# Patient Record
Sex: FEMALE | Race: WHITE | NOT HISPANIC OR LATINO | Employment: OTHER | URBAN - NONMETROPOLITAN AREA
[De-identification: names, ages, dates, MRNs, and addresses within clinical notes are randomized per-mention and may not be internally consistent; named-entity substitution may affect disease eponyms.]

---

## 2017-03-06 ENCOUNTER — HOSPITAL ENCOUNTER (OUTPATIENT)
Dept: ULTRASOUND IMAGING | Facility: HOSPITAL | Age: 62
Discharge: HOME | End: 2017-03-06
Payer: COMMERCIAL

## 2017-03-06 ENCOUNTER — TRANSCRIBE ORDERS (OUTPATIENT)
Dept: ADMINISTRATIVE | Facility: HOSPITAL | Age: 62
End: 2017-03-06

## 2017-03-06 ENCOUNTER — HOSPITAL ENCOUNTER (OUTPATIENT)
Dept: RADIOLOGY | Facility: HOSPITAL | Age: 62
Discharge: HOME | End: 2017-03-06
Attending: UROLOGY
Payer: COMMERCIAL

## 2017-03-06 DIAGNOSIS — N20.0 URIC ACID NEPHROLITHIASIS: Primary | ICD-10-CM

## 2017-03-06 DIAGNOSIS — N20.0 URIC ACID NEPHROLITHIASIS: ICD-10-CM

## 2017-03-06 DIAGNOSIS — N20.0 CALCULUS OF KIDNEY: ICD-10-CM

## 2017-03-06 PROCEDURE — 76770 US EXAM ABDO BACK WALL COMP: CPT

## 2017-03-06 PROCEDURE — 74000 HB X-RAY EXAM OF ABDOMEN (SINGLE ANTEROPOSTERIOR VIEW): CPT

## 2017-03-20 ENCOUNTER — APPOINTMENT (OUTPATIENT)
Dept: RADIOLOGY | Facility: HOSPITAL | Age: 62
End: 2017-03-20
Attending: UROLOGY
Payer: COMMERCIAL

## 2017-04-03 ENCOUNTER — TRANSCRIBE ORDERS (OUTPATIENT)
Dept: ADMINISTRATIVE | Facility: HOSPITAL | Age: 62
End: 2017-04-03

## 2017-04-03 ENCOUNTER — ALLSCRIPTS OFFICE VISIT (OUTPATIENT)
Dept: OTHER | Facility: OTHER | Age: 62
End: 2017-04-03

## 2017-04-03 DIAGNOSIS — N20.0 URIC ACID NEPHROLITHIASIS: Primary | ICD-10-CM

## 2018-01-13 VITALS
HEART RATE: 80 BPM | HEIGHT: 63 IN | RESPIRATION RATE: 16 BRPM | BODY MASS INDEX: 31.54 KG/M2 | DIASTOLIC BLOOD PRESSURE: 70 MMHG | WEIGHT: 178 LBS | SYSTOLIC BLOOD PRESSURE: 130 MMHG

## 2018-04-03 ENCOUNTER — TRANSCRIBE ORDERS (OUTPATIENT)
Dept: ADMINISTRATIVE | Facility: HOSPITAL | Age: 63
End: 2018-04-03

## 2018-04-03 ENCOUNTER — HOSPITAL ENCOUNTER (OUTPATIENT)
Dept: RADIOLOGY | Facility: HOSPITAL | Age: 63
Discharge: HOME | End: 2018-04-03
Payer: COMMERCIAL

## 2018-04-03 ENCOUNTER — HOSPITAL ENCOUNTER (OUTPATIENT)
Dept: ULTRASOUND IMAGING | Facility: HOSPITAL | Age: 63
Discharge: HOME | End: 2018-04-03
Attending: UROLOGY
Payer: COMMERCIAL

## 2018-04-03 DIAGNOSIS — N20.0 CALCULUS OF KIDNEY: ICD-10-CM

## 2018-04-03 DIAGNOSIS — N20.0 URIC ACID NEPHROLITHIASIS: ICD-10-CM

## 2018-04-03 PROCEDURE — 76770 US EXAM ABDO BACK WALL COMP: CPT

## 2018-04-03 PROCEDURE — 74018 RADEX ABDOMEN 1 VIEW: CPT

## 2018-04-10 ENCOUNTER — PROCEDURE VISIT (OUTPATIENT)
Dept: UROLOGY | Facility: CLINIC | Age: 63
End: 2018-04-10
Payer: COMMERCIAL

## 2018-04-10 VITALS
BODY MASS INDEX: 31.71 KG/M2 | HEIGHT: 63 IN | HEART RATE: 86 BPM | SYSTOLIC BLOOD PRESSURE: 150 MMHG | WEIGHT: 179 LBS | DIASTOLIC BLOOD PRESSURE: 88 MMHG

## 2018-04-10 DIAGNOSIS — R93.89 ABNORMAL ULTRASOUND: Primary | ICD-10-CM

## 2018-04-10 DIAGNOSIS — N20.0 CALCULUS OF KIDNEY: ICD-10-CM

## 2018-04-10 LAB
SL AMB  POCT GLUCOSE, UA: NORMAL
SL AMB LEUKOCYTE ESTERASE,UA: NORMAL
SL AMB POCT BILIRUBIN,UA: NORMAL
SL AMB POCT BLOOD,UA: NORMAL
SL AMB POCT CLARITY,UA: CLEAR
SL AMB POCT COLOR,UA: YELLOW
SL AMB POCT KETONES,UA: NORMAL
SL AMB POCT NITRITE,UA: NORMAL
SL AMB POCT PH,UA: 5
SL AMB POCT SPECIFIC GRAVITY,UA: 1
SL AMB POCT URINE PROTEIN: NORMAL
SL AMB POCT UROBILINOGEN: NORMAL

## 2018-04-10 PROCEDURE — 52000 CYSTOURETHROSCOPY: CPT | Performed by: UROLOGY

## 2018-04-10 PROCEDURE — 99213 OFFICE O/P EST LOW 20 MIN: CPT | Performed by: UROLOGY

## 2018-04-10 PROCEDURE — 81002 URINALYSIS NONAUTO W/O SCOPE: CPT | Performed by: UROLOGY

## 2018-04-10 NOTE — PROGRESS NOTES
UROLOGY PROCEDURE NOTE     History of Present Illness:   Alejandrina Fagan is a 65 y o  female here for follow up evaluation of her nephrolithiasis  She states that since her last appointment she has not passed any urinary calculi and denies lower urinary tract symptoms including gross hematuria  The last time she passed a stone was in September  Prior to this she believes that she has only passed 1 stone 5 years prior  No surgical intervention was ever needed  Has been increasing her hydration as she believes this is the most likely reason she has developed calculi  Ultrasound and KUB obtained prior to visit in  reveals a 4 mm left lower pole calculi  Patient underwent a renal bladder ultrasound and KUB in preparation for her upcoming appointment  Unfortunately, the bladder portion of the ultrasound demonstrated an abnormality at the trigone  Patient's appoint was adjusted to a cystoscopy to evaluate this area with direct visualization  Past Medical History:   History reviewed  No pertinent past medical history  PAST SURGICAL HISTORY:     Past Surgical History:   Procedure Laterality Date     SECTION      DILATION AND CURETTAGE OF UTERUS         CURRENT MEDICATIONS:     Current Outpatient Prescriptions   Medication Sig Dispense Refill    Ascorbic Acid (VITAMIN C) 1000 MG tablet Take 1,000 mg by mouth daily   aspirin (ECOTRIN LOW STRENGTH) 81 mg EC tablet Take 81 mg by mouth daily   Calcium Carb-Cholecalciferol (CALCIUM 600 + D PO) Take 1 tablet by mouth daily   Omega-3 Fatty Acids (FISH OIL) 500 MG CAPS Take 1 capsule by mouth   simvastatin (ZOCOR) 20 mg tablet Take 20 mg by mouth daily at bedtime   ondansetron (ZOFRAN) 4 mg tablet Take 1 tablet (4 mg total) by mouth every 6 (six) hours  12 tablet 0    oxyCODONE-acetaminophen (PERCOCET) 5-325 mg per tablet Take 1 tablet by mouth every 4 (four) hours as needed for moderate pain   Max Daily Amount: 6 tablets 16 tablet 0     No current facility-administered medications for this visit          ALLERGIES:     Allergies   Allergen Reactions    Penicillins        SOCIAL HISTORY:     Social History     Social History    Marital status: /Civil Union     Spouse name: N/A    Number of children: N/A    Years of education: N/A     Occupational History    director  mental health      Social History Main Topics    Smoking status: Never Smoker    Smokeless tobacco: Never Used    Alcohol use Yes    Drug use: No    Sexual activity: Not Asked     Other Topics Concern    None     Social History Narrative    None       SOCIAL HISTORY:     Family History   Problem Relation Age of Onset    Endometrial cancer Mother     Endometrial cancer Sister     Endometrial cancer Cousin        REVIEW OF SYSTEMS:     General: negative for chills, fatigue, fever, significant unplanned weight changed  Psychological: negative for anxiety, depression, concentration or memory difficulties, irritability, mood swings, sleep disturbances  Ophthalmic: negative for blurry vision or double  ENT: negative for hearing difficulties, tinnitus, vertigo  Hematological and Lymphatic: negative for bleeding problems, blood clots, bruising, swollen lymph nodes  Respiratory: negative for shortness of breath, cough, hemoptysis, orthopnea, tachypnea or wheezing  Cardiovascular: negative for chest pain, dyspnea on exertion, edema, irregular or rapid heartbeat, paroxysmal nocturnal dyspnea  Gastrointestinal: negative for abdominal pain, bright red blood in stools, change in stools, constipation, diarrhea, nausea/vomiting, stool incontinence    GENITOURINARY: see HPI    Musculoskeletal: negative for gait disturbance, joint pain, joint stiffness/sweeling/pain, muscular weakness  Dermatological: negative for rash or skin lesion changes  Neurological: negative for confusion, dizziness, headaches, memory loss, numbness/tingling, seizures, speech problems, tremors or weakness    PHYSICAL EXAM:     /88   Pulse 86   Ht 5' 3" (1 6 m)   Wt 81 2 kg (179 lb)   BMI 31 71 kg/m²   General:  Healthy appearing individual in no acute distress  They have a normal affect  There is not appear to be any gross neurologic defects or abnormalities  HEENT:  Normocephalic, atraumatic  Neck is supple without any palpable lymphadenopathy  Cardiovascular:  Patient has normal palpable distal radial pulses  There is no significant peripheral edema  No JVD is noted  Respiratory:  Patient has unlabored respirations  There is no audible wheeze or rhonchi  Abdomen:  Abdomen is without surgical scars  Abdomen is soft and nontender  There is no tympany  Inguinal and umbilical hernia are not appreciated  Genitourinary:  Normal external female genitalia    Musculoskeletal:  Patient does not have significant CVA tenderness with palpation or percussion  They full range of motion in all 4 extremities  Strength in all 4 extremities appears congruent  Patient is able to ambulate without assistance or difficulty  Dermatologic:  Patient has no skin abnormalities or rashes  LABS:     CBC:   Lab Results   Component Value Date    WBC 7 38 2016    HGB 13 4 2016    HCT 40 2 2016    MCV 95 2016     2016       BMP:   Lab Results   Component Value Date    GLUCOSE 119 2016    CALCIUM 9 2 2016     2016    K 4 5 2016    CO2 25 2016     2016    BUN 16 2016    CREATININE 0 80 2016       IMAGIN/3/18  RENAL ULTRASOUND     INDICATION:   N20 0: Calculus of kidney      COMPARISON: None     TECHNIQUE:   Ultrasound of the retroperitoneum was performed with a curvilinear transducer utilizing volumetric sweeps and still imaging techniques       FINDINGS:     KIDNEYS:  Symmetric and normal size  Right kidney:  10 3 x 4 5 cm  Left kidney:  10 3 x 5 2 cm      Right kidney  Normal echogenicity and contour  No suspicious masses detected  No hydronephrosis  No shadowing calculi  No perinephric fluid collections      Left kidney  Normal echogenicity and contour  No suspicious masses detected  No hydronephrosis  No shadowing calculi  No perinephric fluid collections      URETERS:  Nonvisualized      BLADDER:   Normally distended  Focal contour irregularity is noted along the posterior inferior bladder wall which persists on lateral decubitus view  Bilateral ureteral jets detected         IMPRESSION:     Normal upper tracts      Focal irregular contour base of the bladder trigone; intrinsic bladder wall lesion not excludable, best correlated clinically      *The study was marked in EPIC for significant notification  4/3/18  ABDOMEN     INDICATION:   N20 0: Calculus of kidney      COMPARISON:  March 6, 2017     VIEWS:  AP supine  Images: 2     FINDINGS:     4 mm calculus overlying the lower pole the left kidney, unchanged in position  No right renal calculi      No radiopaque ureteral calculi identified      Nonobstructive bowel gas pattern      No acute osseous abnormality is seen      IMPRESSION:     Lower pole left renal calculus (4 mm), unchanged in position  PROCEDURE:       Cystoscopy Procedure note    Risk and benefits of flexible cystoscopy were discussed  Informed consent was obtained  A urine dip is adequate for cystoscopy  The patient was placed into the modified supine position  Her genitalia was prepped with Betadine and draped in a sterile fashion  Viscous 2% lidocaine was instilled into the urethra and allowed dwell time for local anesthesia  Flexible cystoscopy was then performed with a 16F scope  Urethra was inspected on entrace and exit of the scope  The bladder was thoroughly inspected in a 360 degree fashion  Both ureteral orifices were visualized in their orthotopic location with clear efflux of urine  The bladder mucosa was thoroughly inspected   There was no evidence of mucosal abnormalities or lesions  We evaluated the posterior bladder floor and did not see any papillary lesions or erythema  There was a small subcentimeter calculus  Retroflexion for evaluation of the bladder neck was normal       Overall this was a negative cystoscopy with the exception of the small stone  A female office staff member was present throughout the entire procedure  ASSESSMENT:     65 y o  female with stone disease     PLAN:     Reviewed the ultrasound findings with the patient and again allowed her to the watch as we performed her cystourethroscopy  I have no concerns about the posterior bladder floor  I will plan to follow her yearly for her 4 mm left lower pole calculus with KUB and ultrasound

## 2019-04-10 ENCOUNTER — HOSPITAL ENCOUNTER (OUTPATIENT)
Dept: ULTRASOUND IMAGING | Facility: HOSPITAL | Age: 64
Discharge: HOME | End: 2019-04-10
Attending: UROLOGY
Payer: COMMERCIAL

## 2019-04-10 ENCOUNTER — HOSPITAL ENCOUNTER (OUTPATIENT)
Dept: RADIOLOGY | Facility: HOSPITAL | Age: 64
Discharge: HOME | End: 2019-04-10
Attending: UROLOGY
Payer: COMMERCIAL

## 2019-04-10 DIAGNOSIS — N20.0 CALCULUS OF KIDNEY: ICD-10-CM

## 2019-04-10 PROCEDURE — 51798 US URINE CAPACITY MEASURE: CPT

## 2019-04-10 PROCEDURE — 74018 RADEX ABDOMEN 1 VIEW: CPT

## 2019-05-02 ENCOUNTER — OFFICE VISIT (OUTPATIENT)
Dept: UROLOGY | Facility: CLINIC | Age: 64
End: 2019-05-02
Payer: COMMERCIAL

## 2019-05-02 VITALS
SYSTOLIC BLOOD PRESSURE: 122 MMHG | HEART RATE: 74 BPM | DIASTOLIC BLOOD PRESSURE: 70 MMHG | WEIGHT: 158 LBS | HEIGHT: 63 IN | BODY MASS INDEX: 28 KG/M2

## 2019-05-02 DIAGNOSIS — N20.0 LEFT NEPHROLITHIASIS: Primary | ICD-10-CM

## 2019-05-02 DIAGNOSIS — N28.1 RENAL CYST, LEFT: ICD-10-CM

## 2019-05-02 PROCEDURE — 99214 OFFICE O/P EST MOD 30 MIN: CPT | Performed by: PHYSICIAN ASSISTANT

## 2020-05-01 ENCOUNTER — HOSPITAL ENCOUNTER (OUTPATIENT)
Dept: ULTRASOUND IMAGING | Facility: HOSPITAL | Age: 65
Discharge: HOME | End: 2020-05-01
Payer: COMMERCIAL

## 2020-05-01 ENCOUNTER — HOSPITAL ENCOUNTER (OUTPATIENT)
Dept: RADIOLOGY | Facility: HOSPITAL | Age: 65
Discharge: HOME | End: 2020-05-01
Payer: COMMERCIAL

## 2020-05-01 DIAGNOSIS — N28.1 RENAL CYST, LEFT: ICD-10-CM

## 2020-05-01 DIAGNOSIS — N20.0 LEFT NEPHROLITHIASIS: ICD-10-CM

## 2020-05-01 PROCEDURE — 76770 US EXAM ABDO BACK WALL COMP: CPT

## 2020-05-01 PROCEDURE — 74018 RADEX ABDOMEN 1 VIEW: CPT

## 2020-05-07 ENCOUNTER — TELEMEDICINE (OUTPATIENT)
Dept: UROLOGY | Facility: CLINIC | Age: 65
End: 2020-05-07
Payer: COMMERCIAL

## 2020-05-07 DIAGNOSIS — N20.0 CALCULUS OF KIDNEY: Primary | ICD-10-CM

## 2020-05-07 PROCEDURE — 99213 OFFICE O/P EST LOW 20 MIN: CPT | Performed by: PHYSICIAN ASSISTANT

## 2021-01-13 ENCOUNTER — TELEPHONE (OUTPATIENT)
Dept: UROLOGY | Facility: AMBULATORY SURGERY CENTER | Age: 66
End: 2021-01-13

## 2021-01-13 NOTE — TELEPHONE ENCOUNTER
Patient called the office, unsure of what her follow up from [address] in May 2020 would be  I offered to put her on wait list for Caryle Deems, since schedules have not been built for May 2021 yet, but the patient agreed that she has not had issues in many years, and she would prefer to get U/S and KUB done in May, and then call our office for results

## 2021-04-12 ENCOUNTER — OFFICE VISIT (OUTPATIENT)
Dept: OBGYN CLINIC | Facility: CLINIC | Age: 66
End: 2021-04-12
Payer: COMMERCIAL

## 2021-04-12 ENCOUNTER — TELEPHONE (OUTPATIENT)
Dept: ADMINISTRATIVE | Facility: OTHER | Age: 66
End: 2021-04-12

## 2021-04-12 VITALS
WEIGHT: 155 LBS | DIASTOLIC BLOOD PRESSURE: 88 MMHG | HEIGHT: 63 IN | BODY MASS INDEX: 27.46 KG/M2 | SYSTOLIC BLOOD PRESSURE: 140 MMHG

## 2021-04-12 DIAGNOSIS — Z01.419 ENCOUNTER FOR WELL WOMAN EXAM WITH ROUTINE GYNECOLOGICAL EXAM: Primary | ICD-10-CM

## 2021-04-12 DIAGNOSIS — Z12.31 ENCOUNTER FOR SCREENING MAMMOGRAM FOR MALIGNANT NEOPLASM OF BREAST: ICD-10-CM

## 2021-04-12 PROCEDURE — S0610 ANNUAL GYNECOLOGICAL EXAMINA: HCPCS | Performed by: OBSTETRICS & GYNECOLOGY

## 2021-04-12 PROCEDURE — G0145 SCR C/V CYTO,THINLAYER,RESCR: HCPCS | Performed by: OBSTETRICS & GYNECOLOGY

## 2021-04-12 PROCEDURE — 87624 HPV HI-RISK TYP POOLED RSLT: CPT | Performed by: OBSTETRICS & GYNECOLOGY

## 2021-04-12 RX ORDER — FLUTICASONE PROPIONATE 50 MCG
2 SPRAY, SUSPENSION (ML) NASAL
COMMUNITY
Start: 2021-02-24

## 2021-04-12 RX ORDER — LORATADINE 10 MG/1
10 CAPSULE, LIQUID FILLED ORAL
COMMUNITY

## 2021-04-12 NOTE — TELEPHONE ENCOUNTER
----- Message from Beatriz Serna sent at 4/12/2021  8:24 AM EDT -----  Regarding: Missing Results  Patient seen in office today, care everywhere has colonoscopy listed from 2018 but no results are offered  If possible could we update the patients chart to show the results? Thank you!

## 2021-04-12 NOTE — PROGRESS NOTES
ASSESSMENT & PLAN: Odalys Bean is a 60 y o  [id number] with normal gynecologic exam     1   Routine well woman exam done today  2  Pap and HPV:  The patient's last pap and hpv was   It was normal     Pap with cotesting was done today  Current ASCCP Guidelines reviewed  - no record available  3  Mammogram ordered  4  Colorectal cancer screening was not ordered  5  The following were reviewed in today's visit: breast self exam, mammography screening ordered, adequate intake of calcium and vitamin D, exercise and healthy diet      CC:  Annual Gynecologic Examination    HPI: Odalys Bean is a 60 y o  [id number] who presents for annual gynecologic examination  She has the following concerns:  None     Health Maintenance:    She wears her seatbelt routinely  She does perform regular monthly self breast exams  She feels safe at home       Past Medical History:   Diagnosis Date    Abnormal Pap smear of cervix     Cervical polyp 3513,6298    Endometrial polyp 2007    Hypercholesteremia        Past Surgical History:   Procedure Laterality Date     SECTION      COLPOSCOPY      CRYOTHERAPY      DILATION AND CURETTAGE OF UTERUS      DILATION AND CURETTAGE OF UTERUS      ENDOMETRIAL BIOPSY      HYSTEROSCOPY         Past OB/Gyn History:  OB History        2    Para   2    Term   2            AB        Living   2       SAB        TAB        Ectopic        Multiple        Live Births                   Family History   Problem Relation Age of Onset    Endometrial cancer Mother     Hypertension Mother     Endometrial cancer Sister     Endometrial cancer Cousin     Breast cancer Maternal Aunt     Breast cancer Paternal Aunt        Social History:  Social History     Socioeconomic History    Marital status: /Civil Union     Spouse name: Not on file    Number of children: Not on file    Years of education: Not on file    Highest education level: Not on file   Occupational History    Occupation: director  mental health   Social Needs    Financial resource strain: Not on file    Food insecurity     Worry: Not on file     Inability: Not on file   [employer] needs     Medical: Not on file     Non-medical: Not on file   Tobacco Use    Smoking status: Never Smoker    Smokeless tobacco: Never Used   Substance and Sexual Activity    Alcohol use: Yes     Comment: socially    Drug use: No    Sexual activity: Yes     Partners: Male     Birth control/protection: Post-menopausal   Lifestyle    Physical activity     Days per week: Not on file     Minutes per session: Not on file    Stress: Not on file   Relationships    Social connections     Talks on phone: Not on file     Gets together: Not on file     Attends Mu-ism service: Not on file     Active member of club or organization: Not on file     Attends meetings of clubs or organizations: Not on file     Relationship status: Not on file    Intimate partner violence     Fear of current or ex partner: Not on file     Emotionally abused: Not on file     Physically abused: Not on file     Forced sexual activity: Not on file   Other Topics Concern    Not on file   Social History Narrative    Not on file       Allergies   Allergen Reactions    Penicillins        Current Outpatient Medications:     aspirin (ECOTRIN LOW STRENGTH) 81 mg EC tablet, Take 81 mg by mouth daily  , Disp: , Rfl:     Calcium Carb-Cholecalciferol (CALCIUM 600 + D PO), Take 1 tablet by mouth daily  , Disp: , Rfl:     fluticasone (FLONASE) 50 mcg/act nasal spray, 2 sprays into each nostril, Disp: , Rfl:     Loratadine 10 MG CAPS, Take 10 mg by mouth, Disp: , Rfl:     simvastatin (ZOCOR) 20 mg tablet, Take 20 mg by mouth daily at bedtime  , Disp: , Rfl:     Ascorbic Acid (VITAMIN C) 1000 MG tablet, Take 1,000 mg by mouth daily  , Disp: , Rfl:     Omega-3 Fatty Acids (FISH OIL) 500 MG CAPS, Take 1 capsule by mouth , Disp: , Rfl:     ondansetron (ZOFRAN) 4 mg tablet, Take 1 tablet (4 mg total) by mouth every 6 (six) hours  (Patient not taking: Reported on 4/12/2021), Disp: 12 tablet, Rfl: 0    oxyCODONE-acetaminophen (PERCOCET) 5-325 mg per tablet, Take 1 tablet by mouth every 4 (four) hours as needed for moderate pain  Max Daily Amount: 6 tablets (Patient not taking: Reported on 5/2/2019), Disp: 16 tablet, Rfl: 0      Review of Systems  Constitutional :no fever, feels well, no tiredness, no recent weight gain or loss  ENT: no ear ache, no loss of hearing, no nosebleeds or nasal discharge, no sore throat or hoarseness  Cardiovascular: no complaints of slow or fast heart beat, no chest pain, no palpitations, no leg claudication or lower extremity edema  Respiratory: no complaints of shortness of shortness of breath, no CORDERO  Breasts:no complaints of breast pain, breast lump, or nipple discharge  Gastrointestinal: no complaints of abdominal pain, constipation, nausea, vomiting, or diarrhea or bloody stools  Genitourinary : no complaints of dysuria, incontinence, pelvic pain, no dysmenorrhea, vaginal discharge or abnormal vaginal bleeding and as noted in HPI  Musculoskeletal: no complaints of arthralgia, no myalgia, no joint swelling or stiffness, no limb pain or swelling  Integumentary: no complaints of skin rash or lesion, itching or dry skin  Neurological: no complaints of headache, no confusion, no numbness or tingling, no dizziness or fainting    Objective      /88   Ht 5' 3" (1 6 m)   Wt 70 3 kg (155 lb)   BMI 27 46 kg/m²     General:   appears stated age, cooperative, alert normal mood and affect   Lungs: Unlabored breathing    Breasts: Inspection negative, no palpable masses     Abdomen: soft, non-tender, without masses or organomegaly   Vulva: normal   Vagina: normal vagina, no discharge, exudate, lesion, or erythema   Urethra: normal   Cervix: Normal, no discharge  Nontender     Uterus: normal size, contour, position, consistency, mobility, non-tender   Adnexa: no mass, fullness, tenderness   Psychiatric orientation to person, place, and time: normal  mood and affect: normal

## 2021-04-14 LAB
HPV HR 12 DNA CVX QL NAA+PROBE: NEGATIVE
HPV16 DNA CVX QL NAA+PROBE: NEGATIVE
HPV18 DNA CVX QL NAA+PROBE: NEGATIVE

## 2021-04-15 LAB
LAB AP GYN PRIMARY INTERPRETATION: NORMAL
Lab: NORMAL

## 2021-04-15 NOTE — TELEPHONE ENCOUNTER
Upon review of the In Basket request we were able to locate, review, and update the patient chart as requested for CRC: Colonoscopy  Any additional questions or concerns should be emailed to the Practice Liaisons via [email or web address] email, please do not reply via In Basket      Thank you  Yamileth Cruz

## 2021-05-07 ENCOUNTER — HOSPITAL ENCOUNTER (OUTPATIENT)
Dept: RADIOLOGY | Facility: HOSPITAL | Age: 66
Discharge: HOME | End: 2021-05-07
Payer: COMMERCIAL

## 2021-05-07 ENCOUNTER — HOSPITAL ENCOUNTER (OUTPATIENT)
Dept: ULTRASOUND IMAGING | Facility: HOSPITAL | Age: 66
Discharge: HOME | End: 2021-05-07
Payer: COMMERCIAL

## 2021-05-07 DIAGNOSIS — N20.0 CALCULUS OF KIDNEY: ICD-10-CM

## 2021-05-07 PROCEDURE — 76770 US EXAM ABDO BACK WALL COMP: CPT

## 2021-05-07 PROCEDURE — 74018 RADEX ABDOMEN 1 VIEW: CPT

## 2021-05-12 ENCOUNTER — TELEPHONE (OUTPATIENT)
Dept: OTHER | Facility: HOSPITAL | Age: 66
End: 2021-05-12

## 2021-05-12 NOTE — TELEPHONE ENCOUNTER
[facility]EM[facility] patient seen by me last year, I received her US/KUB has a small left lower pole stone which is stable few years  Has not had any symptoms or stone passage in several years  She even had cysto in 2018 for abnormal US which was also normal  Anyway, If she continues to feel well she can followup PRN, if having any concerns can schedule routine AP visit- nothing pending  Parisa Falk

## 2021-05-13 NOTE — TELEPHONE ENCOUNTER
Pt is aware of US/KUB  Pt is not having any issues as of now  Pt will call if she has any problems with the stone

## 2022-05-16 ENCOUNTER — ANNUAL EXAM (OUTPATIENT)
Dept: OBGYN CLINIC | Facility: CLINIC | Age: 67
End: 2022-05-16
Payer: MEDICARE

## 2022-05-16 VITALS
SYSTOLIC BLOOD PRESSURE: 116 MMHG | HEIGHT: 63 IN | DIASTOLIC BLOOD PRESSURE: 64 MMHG | BODY MASS INDEX: 27.71 KG/M2 | WEIGHT: 156.4 LBS

## 2022-05-16 DIAGNOSIS — Z01.419 ENCOUNTER FOR WELL WOMAN EXAM WITH ROUTINE GYNECOLOGICAL EXAM: Primary | ICD-10-CM

## 2022-05-16 DIAGNOSIS — Z12.31 ENCOUNTER FOR SCREENING MAMMOGRAM FOR MALIGNANT NEOPLASM OF BREAST: ICD-10-CM

## 2022-05-16 PROCEDURE — G0101 CA SCREEN;PELVIC/BREAST EXAM: HCPCS | Performed by: OBSTETRICS & GYNECOLOGY

## 2022-05-16 RX ORDER — FLUOCINONIDE TOPICAL SOLUTION USP, 0.05% 0.5 MG/ML
SOLUTION TOPICAL
COMMUNITY
Start: 2022-04-21

## 2022-05-16 RX ORDER — KETOCONAZOLE 20 MG/ML
SHAMPOO TOPICAL
COMMUNITY
Start: 2022-04-14

## 2022-05-16 NOTE — PROGRESS NOTES
ASSESSMENT & PLAN: Malika Yuan is a 73 y o  [id number] with normal gynecologic exam     1   Routine well woman exam done today  2  Pap and HPV:  The patient's last pap and hpv was   It was normal     Pap with cotesting was not done today  Current ASCCP Guidelines reviewed  3   Mammogram ordered  4  Colorectal cancer screening was notordered  5   The following were reviewed in today's visit: breast self exam, mammography screening ordered, menopause, osteoporosis, adequate intake of calcium and vitamin D, exercise and healthy diet  CC:  Annual Gynecologic Examination    HPI: Malika Yuan is a 73 y o  [id number] who presents for annual gynecologic examination  She has the following concerns:  None     Health Maintenance:    She wears her seatbelt routinely  She does perform regular monthly self breast exams  She feels safe at home       Past Medical History:   Diagnosis Date    Abnormal Pap smear of cervix     Alopecia     Cervical polyp [id number]    Endometrial polyp     Hypercholesteremia     Lichen planus follicularis        Past Surgical History:   Procedure Laterality Date     SECTION      COLPOSCOPY      CRYOTHERAPY      DILATION AND CURETTAGE OF UTERUS      DILATION AND CURETTAGE OF UTERUS      ENDOMETRIAL BIOPSY      HYSTEROSCOPY         Past OB/Gyn History:  OB History        2    Para   2    Term   2            AB        Living   2       SAB        IAB        Ectopic        Multiple        Live Births                       Family History   Problem Relation Age of Onset    Endometrial cancer Mother     Hypertension Mother     Endometrial cancer Sister     Endometrial cancer Cousin     Breast cancer Maternal Aunt     Breast cancer Paternal Aunt        Social History:  Social History     Socioeconomic History    Marital status: /Civil Union     Spouse name: Not on file    Number of children: Not on file  Years of education: Not on file    Highest education level: Not on file   Occupational History    Occupation: director  mental health   Tobacco Use    Smoking status: Never Smoker    Smokeless tobacco: Never Used   Vaping Use    Vaping Use: Never used   Substance and Sexual Activity    Alcohol use: Yes     Comment: socially    Drug use: No    Sexual activity: Not Currently     Partners: Male     Birth control/protection: Post-menopausal   Other Topics Concern    Not on file   Social History Narrative    Not on file     Social Determinants of Health     Financial Resource Strain: Not on file   Food Insecurity: Not on file   Transportation Needs: Not on file   Physical Activity: Not on file   Stress: Not on file   Social Connections: Not on file   Intimate Partner Violence: Not on file   Housing Stability: Not on file         Allergies   Allergen Reactions    Penicillins          Current Outpatient Medications:     Calcium Carb-Cholecalciferol (CALCIUM 600 + D PO), Take 1 tablet by mouth daily  , Disp: , Rfl:     fluocinonide (LIDEX) 0 05 % external solution, APPLY SOLUTION TOPICALLY TO AFFECTED AREA TWICE DAILY, Disp: , Rfl:     fluticasone (FLONASE) 50 mcg/act nasal spray, 2 sprays into each nostril, Disp: , Rfl:     ketoconazole (NIZORAL) 2 % shampoo, APPLY TO SCALP, LATHER, LEAVE ON 5 MINUTES AND RINSE EVERY OTHER DAY, Disp: , Rfl:     Loratadine 10 MG CAPS, Take 10 mg by mouth, Disp: , Rfl:     simvastatin (ZOCOR) 20 mg tablet, Take 20 mg by mouth daily at bedtime  , Disp: , Rfl:     Ascorbic Acid (VITAMIN C) 1000 MG tablet, Take 1,000 mg by mouth daily  , Disp: , Rfl:     aspirin (ECOTRIN LOW STRENGTH) 81 mg EC tablet, Take 81 mg by mouth daily  , Disp: , Rfl:     Omega-3 Fatty Acids (FISH OIL) 500 MG CAPS, Take 1 capsule by mouth , Disp: , Rfl:     ondansetron (ZOFRAN) 4 mg tablet, Take 1 tablet (4 mg total) by mouth every 6 (six) hours  , Disp: 12 tablet, Rfl: 0    oxyCODONE-acetaminophen (PERCOCET) 5-325 mg per tablet, Take 1 tablet by mouth every 4 (four) hours as needed for moderate pain  Max Daily Amount: 6 tablets, Disp: 16 tablet, Rfl: 0    Review of Systems  Constitutional :no fever, feels well, no tiredness, no recent weight gain or loss  ENT: no ear ache, no loss of hearing, no nosebleeds or nasal discharge, no sore throat or hoarseness  Cardiovascular: no complaints of slow or fast heart beat, no chest pain, no palpitations, no leg claudication or lower extremity edema  Respiratory: no complaints of shortness of shortness of breath, no CORDERO  Breasts:no complaints of breast pain, breast lump, or nipple discharge  Gastrointestinal: no complaints of abdominal pain, constipation, nausea, vomiting, or diarrhea or bloody stools  Genitourinary : no complaints of dysuria, incontinence, pelvic pain, no dysmenorrhea, vaginal discharge or abnormal vaginal bleeding and as noted in HPI  Musculoskeletal: no complaints of arthralgia, no myalgia, no joint swelling or stiffness, no limb pain or swelling  Integumentary: no complaints of skin rash or lesion, itching or dry skin  Neurological: no complaints of headache, no confusion, no numbness or tingling, no dizziness or fainting    Objective      /64 (BP Location: Right arm, Patient Position: Sitting, Cuff Size: Standard)   Ht 5' 3" (1 6 m)   Wt 70 9 kg (156 lb 6 4 oz)   LMP  (LMP Unknown)   BMI 27 71 kg/m²   General:   appears stated age, cooperative, alert normal mood and affect   Lungs: Unlabored breathing    Breasts: normal appearance, no masses or tenderness   Abdomen: soft, non-tender, without masses or organomegaly   Vulva: normal   Vagina: normal vagina, no discharge, exudate, lesion, or erythema   Urethra: normal   Cervix: Normal, no discharge  Nontender     Uterus: normal size, contour, position, consistency, mobility, non-tender   Adnexa: no mass, fullness, tenderness   Psychiatric orientation to person, place, and time: normal  mood and affect: normal

## 2023-06-19 ENCOUNTER — ANNUAL EXAM (OUTPATIENT)
Dept: OBGYN CLINIC | Facility: CLINIC | Age: 68
End: 2023-06-19
Payer: MEDICARE

## 2023-06-19 VITALS
DIASTOLIC BLOOD PRESSURE: 82 MMHG | BODY MASS INDEX: 27.46 KG/M2 | HEIGHT: 63 IN | WEIGHT: 155 LBS | SYSTOLIC BLOOD PRESSURE: 128 MMHG

## 2023-06-19 DIAGNOSIS — Z12.31 ENCOUNTER FOR SCREENING MAMMOGRAM FOR BREAST CANCER: ICD-10-CM

## 2023-06-19 DIAGNOSIS — Z01.419 ENCOUNTER FOR GYNECOLOGICAL EXAMINATION: Primary | ICD-10-CM

## 2023-06-19 PROCEDURE — G0101 CA SCREEN;PELVIC/BREAST EXAM: HCPCS | Performed by: OBSTETRICS & GYNECOLOGY

## 2023-06-19 RX ORDER — DOXYCYCLINE 100 MG/1
CAPSULE ORAL
COMMUNITY
Start: 2023-02-16

## 2023-06-19 RX ORDER — EZETIMIBE 10 MG/1
10 TABLET ORAL DAILY
COMMUNITY
Start: 2023-04-26

## 2023-06-19 NOTE — PROGRESS NOTES
ASSESSMENT & PLAN: Emigdio Leong is a 78 y o  [id number] with normal gynecologic exam     1   Routine well woman exam done today  2  Pap and HPV:  The patient's last pap and hpv was   It was normal     Pap with cotesting was not done today  Current ASCCP Guidelines reviewed  3   Mammogram ordered  4  Colorectal cancer screening was notordered  5   The following were reviewed in today's visit: breast self exam, mammography screening ordered, menopause, exercise and healthy diet  CC:  Annual Gynecologic Examination    HPI: Emigdio Leong is a 78 y o  [id number] who presents for annual gynecologic examination  She has the following concerns:  None      Health Maintenance:    She wears her seatbelt routinely  She does perform regular monthly self breast exams  She feels safe at home       Past Medical History:   Diagnosis Date   • Abnormal Pap smear of cervix    • Alopecia    • Cervical polyp 167,   • Endometrial polyp    • Hypercholesteremia    • Lichen planus follicularis        Past Surgical History:   Procedure Laterality Date   •  SECTION     • COLPOSCOPY     • CRYOTHERAPY     • DILATION AND CURETTAGE OF UTERUS     • DILATION AND CURETTAGE OF UTERUS     • ENDOMETRIAL BIOPSY     • HYSTEROSCOPY         Past OB/Gyn History:  OB History        2    Para   2    Term   2            AB        Living   2       SAB        IAB        Ectopic        Multiple        Live Births   2                   Family History   Problem Relation Age of Onset   • Endometrial cancer Mother    • Hypertension Mother    • Endometrial cancer Sister    • Endometrial cancer Cousin    • Breast cancer Maternal Aunt    • Breast cancer Paternal Aunt        Social History:  Social History     Socioeconomic History   • Marital status: /Civil Union     Spouse name: Not on file   • Number of children: Not on file   • Years of education: Not on file   • Highest education level: Not on file   Occupational History   • Occupation: director  mental health   Tobacco Use   • Smoking status: Never   • Smokeless tobacco: Never   Vaping Use   • Vaping Use: Never used   Substance and Sexual Activity   • Alcohol use: Yes     Comment: socially   • Drug use: No   • Sexual activity: Not Currently     Partners: Male     Birth control/protection: Post-menopausal   Other Topics Concern   • Not on file   Social History Narrative   • Not on file     Social Determinants of Health     Financial Resource Strain: Not on file   Food Insecurity: Not on file   Transportation Needs: Not on file   Physical Activity: Not on file   Stress: Not on file   Social Connections: Not on file   Intimate Partner Violence: Not on file   Housing Stability: Not on file         Allergies   Allergen Reactions   • Penicillins          Current Outpatient Medications:   •  Calcium Carb-Cholecalciferol (CALCIUM 600 + D PO), Take 1 tablet by mouth daily  , Disp: , Rfl:   •  doxycycline monohydrate (MONODOX) 100 mg capsule, Take 1 pill 2 times daily with food, Disp: , Rfl:   •  ezetimibe (ZETIA) 10 mg tablet, Take 10 mg by mouth daily, Disp: , Rfl:   •  fluocinonide (LIDEX) 0 05 % external solution, APPLY SOLUTION TOPICALLY TO AFFECTED AREA TWICE DAILY, Disp: , Rfl:   •  fluticasone (FLONASE) 50 mcg/act nasal spray, 2 sprays into each nostril, Disp: , Rfl:   •  ketoconazole (NIZORAL) 2 % shampoo, APPLY TO SCALP, LATHER, LEAVE ON 5 MINUTES AND RINSE EVERY OTHER DAY, Disp: , Rfl:   •  Loratadine 10 MG CAPS, Take 10 mg by mouth, Disp: , Rfl:   •  Ascorbic Acid (VITAMIN C) 1000 MG tablet, Take 1,000 mg by mouth daily  (Patient not taking: Reported on 6/19/2023), Disp: , Rfl:   •  aspirin (ECOTRIN LOW STRENGTH) 81 mg EC tablet, Take 81 mg by mouth daily  (Patient not taking: Reported on 6/19/2023), Disp: , Rfl:   •  Omega-3 Fatty Acids (FISH OIL) 500 MG CAPS, Take 1 capsule by mouth   (Patient not taking: Reported on 6/19/2023), "Disp: , Rfl:   •  ondansetron (ZOFRAN) 4 mg tablet, Take 1 tablet (4 mg total) by mouth every 6 (six) hours  (Patient not taking: Reported on 6/19/2023), Disp: 12 tablet, Rfl: 0  •  oxyCODONE-acetaminophen (PERCOCET) 5-325 mg per tablet, Take 1 tablet by mouth every 4 (four) hours as needed for moderate pain  Max Daily Amount: 6 tablets (Patient not taking: Reported on 6/19/2023), Disp: 16 tablet, Rfl: 0  •  simvastatin (ZOCOR) 20 mg tablet, Take 20 mg by mouth daily at bedtime  (Patient not taking: Reported on 6/19/2023), Disp: , Rfl:     Review of Systems  Constitutional :no fever, feels well, no tiredness, no recent weight gain or loss  ENT: no ear ache, no loss of hearing, no nosebleeds or nasal discharge, no sore throat or hoarseness  Cardiovascular: no complaints of slow or fast heart beat, no chest pain, no palpitations, no leg claudication or lower extremity edema  Respiratory: no complaints of shortness of shortness of breath, no CORDERO  Breasts:no complaints of breast pain, breast lump, or nipple discharge  Gastrointestinal: no complaints of abdominal pain, constipation, nausea, vomiting, or diarrhea or bloody stools  Genitourinary : no complaints of dysuria, incontinence, pelvic pain, no dysmenorrhea, vaginal discharge or abnormal vaginal bleeding and as noted in HPI  Musculoskeletal: no complaints of arthralgia, no myalgia, no joint swelling or stiffness, no limb pain or swelling    Integumentary: no complaints of skin rash or lesion, itching or dry skin  Neurological: no complaints of headache, no confusion, no numbness or tingling, no dizziness or fainting    Objective      /82   Ht 5' 3\" (1 6 m)   Wt 70 3 kg (155 lb)   LMP  (LMP Unknown)   BMI 27 46 kg/m²   General:   appears stated age, cooperative, alert normal mood and affect   Lungs: Unlabored breathing    Breasts: normal appearance, no masses or tenderness   Abdomen: soft, non-tender, without masses or organomegaly   Vulva: normal " Vagina: normal vagina, no discharge, exudate, lesion, or erythema   Urethra: normal   Cervix: Normal, no discharge  Nontender     Uterus: normal size, contour, position, consistency, mobility, non-tender   Adnexa: no mass, fullness, tenderness   Psychiatric orientation to person, place, and time: normal  mood and affect: normal

## 2023-09-21 ENCOUNTER — OFFICE VISIT (OUTPATIENT)
Dept: URGENT CARE | Facility: CLINIC | Age: 68
End: 2023-09-21
Payer: MEDICARE

## 2023-09-21 VITALS
HEART RATE: 74 BPM | TEMPERATURE: 98.5 F | SYSTOLIC BLOOD PRESSURE: 130 MMHG | RESPIRATION RATE: 18 BRPM | HEIGHT: 63 IN | WEIGHT: 146 LBS | DIASTOLIC BLOOD PRESSURE: 72 MMHG | OXYGEN SATURATION: 99 % | BODY MASS INDEX: 25.87 KG/M2

## 2023-09-21 DIAGNOSIS — L24.9 IRRITANT CONTACT DERMATITIS, UNSPECIFIED TRIGGER: Primary | ICD-10-CM

## 2023-09-21 PROCEDURE — 99213 OFFICE O/P EST LOW 20 MIN: CPT

## 2023-09-21 PROCEDURE — G0463 HOSPITAL OUTPT CLINIC VISIT: HCPCS

## 2023-09-21 NOTE — PATIENT INSTRUCTIONS
Keep area clean and dry. Apply calamine or caldril lotion. May take claritin or zyrtec as needed for itching. May also apply hydrocortisone on the area. Do not pop the blister. If they pop cover the area.

## 2023-09-21 NOTE — PROGRESS NOTES
[facility]        NAME: Rebekah Vega is a 80 y.o. female  : 1955    MRN: [id number]  DATE: 2023  TIME: 10:46 AM    Assessment and Plan   Irritant contact dermatitis, unspecified trigger [L24.9]  1. Irritant contact dermatitis, unspecified trigger              Patient Instructions       Follow up with PCP in 3-5 days. Proceed to  ER if symptoms worsen. Chief Complaint     Chief Complaint   Patient presents with   • Rash         History of Present Illness       HPI    Review of Systems   Review of Systems      Current Medications       Current Outpatient Medications:   •  Calcium Carb-Cholecalciferol (CALCIUM 600 + D PO), Take 1 tablet by mouth daily. , Disp: , Rfl:   •  ezetimibe (ZETIA) 10 mg tablet, Take 10 mg by mouth daily, Disp: , Rfl:   •  Ascorbic Acid (VITAMIN C) 1000 MG tablet, Take 1,000 mg by mouth daily. (Patient not taking: Reported on 2023), Disp: , Rfl:   •  aspirin (ECOTRIN LOW STRENGTH) 81 mg EC tablet, Take 81 mg by mouth daily. (Patient not taking: Reported on 2023), Disp: , Rfl:   •  doxycycline monohydrate (MONODOX) 100 mg capsule, Take 1 pill 2 times daily with food, Disp: , Rfl:   •  fluocinonide (LIDEX) 0.05 % external solution, APPLY SOLUTION TOPICALLY TO AFFECTED AREA TWICE DAILY, Disp: , Rfl:   •  fluticasone (FLONASE) 50 mcg/act nasal spray, 2 sprays into each nostril, Disp: , Rfl:   •  ketoconazole (NIZORAL) 2 % shampoo, APPLY TO SCALP, LATHER, LEAVE ON 5 MINUTES AND RINSE EVERY OTHER DAY, Disp: , Rfl:   •  Loratadine 10 MG CAPS, Take 10 mg by mouth, Disp: , Rfl:   •  Omega-3 Fatty Acids (FISH OIL) 500 MG CAPS, Take 1 capsule by mouth. (Patient not taking: Reported on 2023), Disp: , Rfl:   •  ondansetron (ZOFRAN) 4 mg tablet, Take 1 tablet (4 mg total) by mouth every 6 (six) hours.  (Patient not taking: Reported on 2023), Disp: 12 tablet, Rfl: 0  •  oxyCODONE-acetaminophen (PERCOCET) 5-325 mg per tablet, Take 1 tablet by mouth every 4 (four) hours as needed for moderate pain. Max Daily Amount: 6 tablets (Patient not taking: Reported on 2023), Disp: 16 tablet, Rfl: 0  •  simvastatin (ZOCOR) 20 mg tablet, Take 20 mg by mouth daily at bedtime. (Patient not taking: Reported on 2023), Disp: , Rfl:     Current Allergies     Allergies as of 2023 - Reviewed 2023   Allergen Reaction Noted   • Penicillins  2016            The following portions of the patient's history were reviewed and updated as appropriate: allergies, current medications, past family history, past medical history, past social history, past surgical history and problem list.     Past Medical History:   Diagnosis Date   • Abnormal Pap smear of cervix    • Alopecia    • Cervical polyp [id number]   • Endometrial polyp    • Hypercholesteremia    • Lichen planus follicularis        Past Surgical History:   Procedure Laterality Date   •  SECTION     • COLPOSCOPY     • CRYOTHERAPY     • DILATION AND CURETTAGE OF UTERUS     • [address] OF UTERUS     • ENDOMETRIAL BIOPSY     • HYSTEROSCOPY         Family History   Problem Relation Age of Onset   • Endometrial cancer Mother    • Hypertension Mother    • Endometrial cancer Sister    • Endometrial cancer Cousin    • Breast cancer Maternal Aunt    • Breast cancer Paternal Aunt          Medications have been verified.         Objective   /72   Pulse 74   Temp 98.5 °F (36.9 °C)   Resp 18   Ht 5' 3" (1.6 m)   Wt 66.2 kg (146 lb)   LMP  (LMP Unknown)   SpO2 99%   BMI 25.86 kg/m²        Physical Exam     Physical Exam

## 2023-09-21 NOTE — PROGRESS NOTES
[facility]        NAME: Maged Hutchins is a 80 y.o. female  : 1955    MRN: [id number]  DATE: 2023  TIME: 10:39 AM    Assessment and Plan   Irritant contact dermatitis, unspecified trigger [L24.9]  1. Irritant contact dermatitis, unspecified trigger          Consistent with poison ivy recommend supportive care. Patient Instructions   Keep area clean and dry. Apply calamine or caldril lotion. May take claritin or zyrtec as needed for itching. May also apply hydrocortisone on the area. Do not pop the blister. If they pop cover the area. Follow up with PCP in 3-5 days. Proceed to  ER if symptoms worsen. Chief Complaint     Chief Complaint   Patient presents with   • Rash         History of Present Illness       Patient is a 26-year-old female who presents to the office today for a blistering rash to the left upper arm. She states that she noticed it Monday or Tuesday. She states that she returned from the beach on Saturday but did not have any sunburn. She did cut the grass but states that she was wearing long sleeves. The rash is itchy and none painful in nature. She is concerned for shingles. Review of Systems   Review of Systems   Skin: Positive for rash. All other systems reviewed and are negative. Current Medications       Current Outpatient Medications:   •  Calcium Carb-Cholecalciferol (CALCIUM 600 + D PO), Take 1 tablet by mouth daily. , Disp: , Rfl:   •  ezetimibe (ZETIA) 10 mg tablet, Take 10 mg by mouth daily, Disp: , Rfl:   •  Ascorbic Acid (VITAMIN C) 1000 MG tablet, Take 1,000 mg by mouth daily. (Patient not taking: Reported on 2023), Disp: , Rfl:   •  aspirin (ECOTRIN LOW STRENGTH) 81 mg EC tablet, Take 81 mg by mouth daily.  (Patient not taking: Reported on 2023), Disp: , Rfl:   •  doxycycline monohydrate (MONODOX) 100 mg capsule, Take 1 pill 2 times daily with food, Disp: , Rfl:   •  fluocinonide (LIDEX) 0.05 % external solution, APPLY SOLUTION TOPICALLY TO AFFECTED AREA TWICE DAILY, Disp: , Rfl:   •  fluticasone (FLONASE) 50 mcg/act nasal spray, 2 sprays into each nostril, Disp: , Rfl:   •  ketoconazole (NIZORAL) 2 % shampoo, APPLY TO SCALP, LATHER, LEAVE ON 5 MINUTES AND RINSE EVERY OTHER DAY, Disp: , Rfl:   •  Loratadine 10 MG CAPS, Take 10 mg by mouth, Disp: , Rfl:   •  Omega-3 Fatty Acids (FISH OIL) 500 MG CAPS, Take 1 capsule by mouth. (Patient not taking: Reported on 2023), Disp: , Rfl:   •  ondansetron (ZOFRAN) 4 mg tablet, Take 1 tablet (4 mg total) by mouth every 6 (six) hours. (Patient not taking: Reported on 2023), Disp: 12 tablet, Rfl: 0  •  oxyCODONE-acetaminophen (PERCOCET) 5-325 mg per tablet, Take 1 tablet by mouth every 4 (four) hours as needed for moderate pain. Max Daily Amount: 6 tablets (Patient not taking: Reported on 2023), Disp: 16 tablet, Rfl: 0  •  simvastatin (ZOCOR) 20 mg tablet, Take 20 mg by mouth daily at bedtime.  (Patient not taking: Reported on 2023), Disp: , Rfl:     Current Allergies     Allergies as of 2023 - Reviewed 2023   Allergen Reaction Noted   • Penicillins  2016            The following portions of the patient's history were reviewed and updated as appropriate: allergies, current medications, past family history, past medical history, past social history, past surgical history and problem list.     Past Medical History:   Diagnosis Date   • Abnormal Pap smear of cervix    • Alopecia    • Cervical polyp [id number]   • Endometrial polyp    • Hypercholesteremia    • Lichen planus follicularis        Past Surgical History:   Procedure Laterality Date   •  SECTION     • COLPOSCOPY     • CRYOTHERAPY     • DILATION AND CURETTAGE OF UTERUS     • [address] OF UTERUS     • ENDOMETRIAL BIOPSY     • HYSTEROSCOPY         Family History   Problem Relation Age of Onset   • Endometrial cancer Mother    • Hypertension Mother    • Endometrial cancer Sister    • Endometrial cancer Cousin    • Breast cancer Maternal Aunt    • Breast cancer Paternal Aunt          Medications have been verified. Objective   BP (!) 172/83   Pulse 74   Temp 98.5 °F (36.9 °C)   Resp 18   Ht 5' 3" (1.6 m)   Wt 66.2 kg (146 lb)   LMP  (LMP Unknown)   SpO2 99%   BMI 25.86 kg/m²        Physical Exam     Physical Exam  Vitals and nursing note reviewed. Constitutional:       Appearance: Normal appearance. She is normal weight. Cardiovascular:      Rate and Rhythm: Normal rate and regular rhythm. Pulses: Normal pulses. Heart sounds: Normal heart sounds. Pulmonary:      Effort: Pulmonary effort is normal.      Breath sounds: Normal breath sounds. Skin:     Capillary Refill: Capillary refill takes less than 2 seconds. Findings: Rash present. Comments: There are satellite rash lesions noted on the upper arm as well as the blistery rash on the posterior aspect of the tricep area. Rash does not follow dermatome and is pruritic in nature. Neurological:      General: No focal deficit present. Mental Status: She is alert and oriented to person, place, and time.

## 2024-04-03 ENCOUNTER — APPOINTMENT (EMERGENCY)
Dept: RADIOLOGY | Facility: HOSPITAL | Age: 69
End: 2024-04-03
Payer: MEDICARE

## 2024-04-03 ENCOUNTER — HOSPITAL ENCOUNTER (EMERGENCY)
Facility: HOSPITAL | Age: 69
Discharge: HOME | End: 2024-04-03
Attending: EMERGENCY MEDICINE | Admitting: EMERGENCY MEDICINE
Payer: MEDICARE

## 2024-04-03 VITALS
SYSTOLIC BLOOD PRESSURE: 156 MMHG | TEMPERATURE: 97.8 F | HEART RATE: 88 BPM | RESPIRATION RATE: 13 BRPM | OXYGEN SATURATION: 100 % | DIASTOLIC BLOOD PRESSURE: 80 MMHG | BODY MASS INDEX: 28.12 KG/M2 | WEIGHT: 158.73 LBS

## 2024-04-03 DIAGNOSIS — R07.9 CHEST PAIN: ICD-10-CM

## 2024-04-03 DIAGNOSIS — F41.9 ANXIETY: Primary | ICD-10-CM

## 2024-04-03 DIAGNOSIS — R07.9 CHEST PAIN, UNSPECIFIED: ICD-10-CM

## 2024-04-03 LAB
2HR DELTA HS TROPONIN: >0 NG/L
ALBUMIN SERPL BCP-MCNC: 4.4 G/DL (ref 3.5–5)
ALP SERPL-CCNC: 61 U/L (ref 34–104)
ALT SERPL W P-5'-P-CCNC: 33 U/L (ref 7–52)
ANION GAP SERPL CALCULATED.3IONS-SCNC: 8 MMOL/L (ref 4–13)
AST SERPL W P-5'-P-CCNC: 22 U/L (ref 13–39)
BASOPHILS # BLD AUTO: 0.04 THOUSANDS/ÂΜL (ref 0–0.1)
BASOPHILS NFR BLD AUTO: 1 % (ref 0–1)
BILIRUB SERPL-MCNC: 0.35 MG/DL (ref 0.2–1)
BUN SERPL-MCNC: 18 MG/DL (ref 5–25)
CALCIUM SERPL-MCNC: 9.6 MG/DL (ref 8.4–10.2)
CARDIAC TROPONIN I PNL SERPL HS: 2 NG/L
CARDIAC TROPONIN I PNL SERPL HS: <2 NG/L
CHLORIDE SERPL-SCNC: 105 MMOL/L (ref 96–108)
CO2 SERPL-SCNC: 28 MMOL/L (ref 21–32)
CREAT SERPL-MCNC: 0.85 MG/DL (ref 0.6–1.3)
D DIMER PPP FEU-MCNC: 0.31 UG/ML FEU
EOSINOPHIL # BLD AUTO: 0.08 THOUSAND/ÂΜL (ref 0–0.61)
EOSINOPHIL NFR BLD AUTO: 1 % (ref 0–6)
ERYTHROCYTE [DISTWIDTH] IN BLOOD BY AUTOMATED COUNT: 12.2 % (ref 11.6–15.1)
GFR SERPL CREATININE-BSD FRML MDRD: 71 ML/MIN/1.73SQ M
GLUCOSE SERPL-MCNC: 103 MG/DL (ref 65–140)
HCT VFR BLD AUTO: 41.3 % (ref 34.8–46.1)
HGB BLD-MCNC: 13.7 G/DL (ref 11.5–15.4)
IMM GRANULOCYTES # BLD AUTO: 0.02 THOUSAND/UL (ref 0–0.2)
IMM GRANULOCYTES NFR BLD AUTO: 0 % (ref 0–2)
LYMPHOCYTES # BLD AUTO: 2.04 THOUSANDS/ÂΜL (ref 0.6–4.47)
LYMPHOCYTES NFR BLD AUTO: 36 % (ref 14–44)
MCH RBC QN AUTO: 32.4 PG (ref 26.8–34.3)
MCHC RBC AUTO-ENTMCNC: 33.2 G/DL (ref 31.4–37.4)
MCV RBC AUTO: 98 FL (ref 82–98)
MONOCYTES # BLD AUTO: 0.47 THOUSAND/ÂΜL (ref 0.17–1.22)
MONOCYTES NFR BLD AUTO: 8 % (ref 4–12)
NEUTROPHILS # BLD AUTO: 2.98 THOUSANDS/ÂΜL (ref 1.85–7.62)
NEUTS SEG NFR BLD AUTO: 54 % (ref 43–75)
NRBC BLD AUTO-RTO: 0 /100 WBCS
PLATELET # BLD AUTO: 330 THOUSANDS/UL (ref 149–390)
PMV BLD AUTO: 9.6 FL (ref 8.9–12.7)
POTASSIUM SERPL-SCNC: 4.1 MMOL/L (ref 3.5–5.3)
PROT SERPL-MCNC: 7.1 G/DL (ref 6.4–8.4)
RBC # BLD AUTO: 4.23 MILLION/UL (ref 3.81–5.12)
SODIUM SERPL-SCNC: 141 MMOL/L (ref 135–147)
WBC # BLD AUTO: 5.63 THOUSAND/UL (ref 4.31–10.16)

## 2024-04-03 PROCEDURE — 71045 X-RAY EXAM CHEST 1 VIEW: CPT

## 2024-04-03 PROCEDURE — 84484 ASSAY OF TROPONIN QUANT: CPT

## 2024-04-03 PROCEDURE — 80053 COMPREHEN METABOLIC PANEL: CPT

## 2024-04-03 PROCEDURE — 85025 COMPLETE CBC W/AUTO DIFF WBC: CPT

## 2024-04-03 PROCEDURE — 85379 FIBRIN DEGRADATION QUANT: CPT | Performed by: EMERGENCY MEDICINE

## 2024-04-03 PROCEDURE — 93005 ELECTROCARDIOGRAM TRACING: CPT

## 2024-04-03 PROCEDURE — 99285 EMERGENCY DEPT VISIT HI MDM: CPT

## 2024-04-03 PROCEDURE — 36415 COLL VENOUS BLD VENIPUNCTURE: CPT | Performed by: EMERGENCY MEDICINE

## 2024-04-03 PROCEDURE — 99285 EMERGENCY DEPT VISIT HI MDM: CPT | Performed by: EMERGENCY MEDICINE

## 2024-04-03 RX ORDER — HYDROXYZINE HYDROCHLORIDE 25 MG/1
25 TABLET, FILM COATED ORAL
Qty: 12 TABLET | Refills: 0 | Status: SHIPPED | OUTPATIENT
Start: 2024-04-03

## 2024-04-03 NOTE — DISCHARGE INSTRUCTIONS
Please follow up with your primary care doctor, you may need further testing such as a stress test.     Please take atarax at night as needed for anxiety.

## 2024-04-03 NOTE — ED PROVIDER NOTES
"History  Chief Complaint   Patient presents with    Chest Pain     Reports \"slight\" chest pain. Reports it started last week, subsided and has since returned. Family h/o cardiac issues, reports CP is giving her anxiety due to the history.      HPI    68-year-old female with past medical history of hyperlipidemia who presents for evaluation of chest pain.  Patient states she had an episode of chest pain last week which started while she was doing household chores.  She states it resolved.  She again developed chest pain earlier this morning.  She states she was doing a light workout and noticed the pain after.  She states pain is in the center of her chest.  Denies any radiation of the pain.  Denies any associated lightheadedness, dizziness, shortness of breath, or nausea/vomiting.  Denies recent fevers, chills, or cough.  Denies abdominal pain.  Denies leg pain or leg swelling.  Patient states she otherwise has never had pain like this in the past.  She states she does have a family history of heart disease but denies any personal history of cardiac disease.  Denies history of DVT or PE.    Prior to Admission Medications   Prescriptions Last Dose Informant Patient Reported? Taking?   Ascorbic Acid (VITAMIN C) 1000 MG tablet  Self Yes No   Sig: Take 1,000 mg by mouth daily.   Patient not taking: Reported on 6/19/2023   Calcium Carb-Cholecalciferol (CALCIUM 600 + D PO)  Self Yes No   Sig: Take 1 tablet by mouth daily.   Loratadine 10 MG CAPS   Yes No   Sig: Take 10 mg by mouth   Omega-3 Fatty Acids (FISH OIL) 500 MG CAPS  Self Yes No   Sig: Take 1 capsule by mouth.   Patient not taking: Reported on 6/19/2023   aspirin (ECOTRIN LOW STRENGTH) 81 mg EC tablet  Self Yes No   Sig: Take 81 mg by mouth daily.   Patient not taking: Reported on 6/19/2023   doxycycline monohydrate (MONODOX) 100 mg capsule   Yes No   Sig: Take 1 pill 2 times daily with food   ezetimibe (ZETIA) 10 mg tablet   Yes No   Sig: Take 10 mg by mouth " daily   fluocinonide (LIDEX) 0.05 % external solution   Yes No   Sig: APPLY SOLUTION TOPICALLY TO AFFECTED AREA TWICE DAILY   fluticasone (FLONASE) 50 mcg/act nasal spray   Yes No   Si sprays into each nostril   ketoconazole (NIZORAL) 2 % shampoo   Yes No   Sig: APPLY TO SCALP, LATHER, LEAVE ON 5 MINUTES AND RINSE EVERY OTHER DAY   ondansetron (ZOFRAN) 4 mg tablet  Self No No   Sig: Take 1 tablet (4 mg total) by mouth every 6 (six) hours.   Patient not taking: Reported on 2023   oxyCODONE-acetaminophen (PERCOCET) 5-325 mg per tablet  Self No No   Sig: Take 1 tablet by mouth every 4 (four) hours as needed for moderate pain. Max Daily Amount: 6 tablets   Patient not taking: Reported on 2023   simvastatin (ZOCOR) 20 mg tablet  Self Yes No   Sig: Take 20 mg by mouth daily at bedtime.   Patient not taking: Reported on 2023      Facility-Administered Medications: None       Past Medical History:   Diagnosis Date    Abnormal Pap smear of cervix     Alopecia     Cervical polyp ,    Endometrial polyp     Hypercholesteremia     Lichen planus follicularis        Past Surgical History:   Procedure Laterality Date     SECTION      COLPOSCOPY      CRYOTHERAPY      DILATION AND CURETTAGE OF UTERUS      DILATION AND CURETTAGE OF UTERUS      ENDOMETRIAL BIOPSY  2009    HYSTEROSCOPY  2007       Family History   Problem Relation Age of Onset    Endometrial cancer Mother     Hypertension Mother     Endometrial cancer Sister     Endometrial cancer Cousin     Breast cancer Maternal Aunt     Breast cancer Paternal Aunt      I have reviewed and agree with the history as documented.    E-Cigarette/Vaping    E-Cigarette Use Never User      E-Cigarette/Vaping Substances    Nicotine No     THC No     CBD No     Flavoring No     Other No     Unknown No      Social History     Tobacco Use    Smoking status: Never    Smokeless tobacco: Never   Vaping Use    Vaping status: Never Used    Substance Use Topics    Alcohol use: Yes     Comment: socially    Drug use: No       Review of Systems   Constitutional:  Negative for appetite change, chills and fever.   HENT:  Negative for congestion, rhinorrhea and sore throat.    Respiratory:  Negative for cough and shortness of breath.    Cardiovascular:  Positive for chest pain. Negative for leg swelling.   Gastrointestinal:  Negative for abdominal pain, diarrhea, nausea and vomiting.   Genitourinary:  Negative for dysuria, frequency, hematuria and urgency.   Musculoskeletal:  Negative for arthralgias and myalgias.   Skin:  Negative for rash.   Neurological:  Negative for dizziness, weakness, light-headedness, numbness and headaches.   All other systems reviewed and are negative.      Physical Exam  Physical Exam  Vitals and nursing note reviewed.   Constitutional:       General: She is not in acute distress.     Appearance: Normal appearance. She is well-developed and normal weight. She is not ill-appearing, toxic-appearing or diaphoretic.   HENT:      Head: Normocephalic and atraumatic.      Right Ear: External ear normal.      Left Ear: External ear normal.      Nose: Nose normal.      Mouth/Throat:      Mouth: Mucous membranes are moist.      Pharynx: Oropharynx is clear.   Eyes:      Extraocular Movements: Extraocular movements intact.      Conjunctiva/sclera: Conjunctivae normal.   Cardiovascular:      Rate and Rhythm: Regular rhythm. Tachycardia present.      Pulses: Normal pulses.      Heart sounds: Normal heart sounds. No murmur heard.     No friction rub. No gallop.   Pulmonary:      Effort: Pulmonary effort is normal. No respiratory distress.      Breath sounds: Normal breath sounds. No decreased breath sounds, wheezing, rhonchi or rales.   Abdominal:      General: There is no distension.      Palpations: Abdomen is soft.      Tenderness: There is no abdominal tenderness. There is no guarding or rebound.   Musculoskeletal:         General: No  tenderness.      Cervical back: Neck supple.      Right lower leg: No tenderness. No edema.      Left lower leg: No tenderness. No edema.   Skin:     General: Skin is warm and dry.      Coloration: Skin is not pale.      Findings: No erythema or rash.   Neurological:      General: No focal deficit present.      Mental Status: She is alert and oriented to person, place, and time.      Cranial Nerves: No cranial nerve deficit.      Sensory: No sensory deficit.      Motor: No weakness.   Psychiatric:         Mood and Affect: Mood normal.         Behavior: Behavior normal.         Vital Signs  ED Triage Vitals [04/03/24 1313]   Temperature Pulse Respirations Blood Pressure SpO2   97.8 °F (36.6 °C) 103 18 (!) 174/88 99 %      Temp Source Heart Rate Source Patient Position - Orthostatic VS BP Location FiO2 (%)   Temporal Monitor Sitting Left arm --      Pain Score       2           Vitals:    04/03/24 1430 04/03/24 1500 04/03/24 1530 04/03/24 1600   BP: 160/83 138/74 148/74 156/80   Pulse: 90 86 92 88   Patient Position - Orthostatic VS: Sitting            Visual Acuity      ED Medications  Medications - No data to display    Diagnostic Studies  Results Reviewed       Procedure Component Value Units Date/Time    HS Troponin I 2hr [[id number]]  (Normal) Collected: 04/03/24 1519    Lab Status: Final result Specimen: Blood from Arm, Right Updated: 04/03/24 1610     hs TnI 2hr 2 ng/L      Delta 2hr hsTnI >0 ng/L     HS Troponin 0hr (reflex protocol) [[id number]]  (Normal) Collected: 04/03/24 1320    Lab Status: Final result Specimen: Blood from Arm, Right Updated: 04/03/24 1348     hs TnI 0hr <2 ng/L     D-dimer, quantitative [[id number]]  (Normal) Collected: 04/03/24 1320    Lab Status: Final result Specimen: Blood from Arm, Right Updated: 04/03/24 1344     D-Dimer, Quant 0.31 ug/ml FEU     Narrative:      In the evaluation for possible pulmonary embolism, in the appropriate (Well's Score of 4 or less) patient, the age adjusted  d-dimer cutoff for this patient can be calculated as:    Age x 0.01 (in ug/mL) for Age-adjusted D-dimer exclusion threshold for a patient over 50 years.    Comprehensive metabolic panel [[id number]] Collected: 04/03/24 1320    Lab Status: Final result Specimen: Blood from Arm, Right Updated: 04/03/24 1342     Sodium 141 mmol/L      Potassium 4.1 mmol/L      Chloride 105 mmol/L      CO2 28 mmol/L      ANION GAP 8 mmol/L      BUN 18 mg/dL      Creatinine 0.85 mg/dL      Glucose 103 mg/dL      Calcium 9.6 mg/dL      AST 22 U/L      ALT 33 U/L      Alkaline Phosphatase 61 U/L      Total Protein 7.1 g/dL      Albumin 4.4 g/dL      Total Bilirubin 0.35 mg/dL      eGFR 71 ml/min/1.73sq m     Narrative:      National Kid[employer] Foundation guidelines for Chronic Kidney Disease (CKD):     Stage 1 with normal or high GFR (GFR > 90 mL/min/1.73 square meters)    Stage 2 Mild CKD (GFR = 60-89 mL/min/1.73 square meters)    Stage 3A Moderate CKD (GFR = 45-59 mL/min/1.73 square meters)    Stage 3B Moderate CKD (GFR = 30-44 mL/min/1.73 square meters)    Stage 4 Severe CKD (GFR = 15-29 mL/min/1.73 square meters)    Stage 5 End Stage CKD (GFR <15 mL/min/1.73 square meters)  Note: GFR calculation is accurate only with a steady state creatinine    CBC and differential [[id number]] Collected: 04/03/24 1320    Lab Status: Final result Specimen: Blood from Arm, Right Updated: 04/03/24 1326     WBC 5.63 Thousand/uL      RBC 4.23 Million/uL      Hemoglobin 13.7 g/dL      Hematocrit 41.3 %      MCV 98 fL      MCH 32.4 pg      MCHC 33.2 g/dL      RDW 12.2 %      MPV 9.6 fL      Platelets 330 Thousands/uL      nRBC 0 /100 WBCs      Neutrophils Relative 54 %      Immature Grans % 0 %      Lymphocytes Relative 36 %      Monocytes Relative 8 %      Eosinophils Relative 1 %      Basophils Relative 1 %      Neutrophils Absolute 2.98 Thousands/µL      Absolute Immature Grans 0.02 Thousand/uL      Absolute Lymphocytes 2.04 Thousands/µL      Absolute  Monocytes 0.47 Thousand/µL      Eosinophils Absolute 0.08 Thousand/µL      Basophils Absolute 0.04 Thousands/µL                    XR chest 1 view portable   Final Result by Urvashi Peace MD (04/03 1632)      No acute cardiopulmonary disease.            Workstation performed: [id number]                    Procedures  ECG 12 Lead Documentation Only    Date/Time: 4/3/2024 2:11 PM    Performed by: Yasmine Sparrow MD  Authorized by: Yasmine Sparrow MD    Indications / Diagnosis:  Chest pain  ECG reviewed by me, the ED Provider: yes    Patient location:  ED  Previous ECG:     Previous ECG:  Unavailable    Comparison to cardiac monitor: Yes    Interpretation:     Interpretation: non-specific    Rate:     ECG rate:  105    ECG rate assessment: tachycardic    Rhythm:     Rhythm: sinus tachycardia    Ectopy:     Ectopy: none    QRS:     QRS axis:  Normal    QRS intervals:  Normal  Conduction:     Conduction: normal    ST segments:     ST segments:  Normal  T waves:     T waves: normal             ED Course             HEART Risk Score      Flowsheet Row Most Recent Value   Heart Score Risk Calculator    History 0 Filed at: 04/03/2024 1401   ECG 0 Filed at: 04/03/2024 1401   Age 2 Filed at: 04/03/2024 1401   Risk Factors 1 Filed at: 04/03/2024 1401   Troponin 0 Filed at: 04/03/2024 1401   HEART Score 3 Filed at: 04/03/2024 1401                          SBIRT 22yo+      Flowsheet Row Most Recent Value   Initial Alcohol Screen: US AUDIT-C     1. How often do you have a drink containing alcohol? 0 Filed at: 04/03/2024 1312   2. How many drinks containing alcohol do you have on a typical day you are drinking?  0 Filed at: 04/03/2024 1312   3a. Male UNDER 65: How often do you have five or more drinks on one occasion? 0 Filed at: 04/03/2024 1312   3b. FEMALE Any Age, or MALE 65+: How often do you have 4 or more drinks on one occassion? 0 Filed at: 04/03/2024 1312   Audit-C Score 0 Filed at: 04/03/2024 1312   CHANDAN: How many  times in the past year have you...    Used an illegal drug or used a prescription medication for non-medical reasons? Never Filed at: 04/03/2024 1312            Wells' Criteria for PE      Flowsheet Row Most Recent Value   Wells' Criteria for PE    Clinical signs and symptoms of DVT 0 Filed at: 04/03/2024 1323   PE is primary diagnosis or equally likely 0 Filed at: 04/03/2024 1323   HR >100 1.5 Filed at: 04/03/2024 1323   Immobilization at least 3 days or Surgery in the previous 4 weeks 0 Filed at: 04/03/2024 1323   Previous, objectively diagnosed PE or DVT 0 Filed at: 04/03/2024 1323   Hemoptysis 0 Filed at: 04/03/2024 1323   Malignancy with treatment within 6 months or palliative 0 Filed at: 04/03/2024 1323   Wells' Criteria Total 1.5 Filed at: 04/03/2024 1323                  Medical Decision Making  Amount and/or Complexity of Data Reviewed  Labs: ordered.    Risk  Prescription drug management.      68-year-old female with past medical history of hyperlipidemia who presents for evaluation of chest pain.   Patient has been having chest pain since earlier this morning, differential diagnosis includes: ACS, anxiety, GI pain, anemia, PE.  Will check CBC to evaluate for anemia, CMP to evaluate for metabolic abnormality, EKG and troponin to evaluate for ACS or arrhythmia, D-dimer to evaluate for PE.  Reviewed labs, D-dimer negative.  CBC and CMP without marked abnormalities.  Initial troponin normal.  Reviewed and interpreted chest x-ray, no acute cardiopulmonary disease.  Reviewed and interpreted EKG, shows sinus tachycardia, no acute ischemic changes.  Reassessed patient, pain is improved.  Patient states she does have history of anxiety and was on Atarax in the past.  Will provide prescription for Atarax for patient.  Will obtain delta troponin.  Signed out to Dr. Johnston, pending delta troponin.  Anticipate discharge with PCP follow-up if negative.       Disposition  Final diagnoses:   Anxiety   Chest pain    Chest pain, unspecified     Time reflects when diagnosis was documented in both MDM as applicable and the Disposition within this note       Time User Action Codes Description Comment    4/3/2024  2:00 PM Yasmine Sparrow Add [F41.9] Anxiety     4/3/2024  2:29 PM Yasmine Sparrow Add [R07.9] Chest pain     4/3/2024  4:12 PM Yousuf Johnston Add [R07.9] Chest pain, unspecified           ED Disposition       ED Disposition   Discharge    Condition   Stable    Date/Time   Wed Apr 3, 2024 1612    Comment   Allison Mayfield discharge to home/self care.                   Follow-up Information       Follow up With Specialties Details Why Contact Info Additional Information    Guido Brown, DO Internal Medicine Schedule an appointment as soon as possible for a visit   [address] [place] [id number]  [phone]       [facility] Cardiology Schedule an appointment as soon as possible for a visit   [address] [place] [id number]  [phone] [facility], [address] [place] [address], [phone]   [phone]            Discharge Medication List as of 4/3/2024  4:13 PM        START taking these medications    Details   hydrOXYzine HCL (ATARAX) 25 mg tablet Take 1 tablet (25 mg total) by mouth daily at bedtime as needed for anxiety, Starting Wed 4/3/2024, Normal           CONTINUE these medications which have NOT CHANGED    Details   Ascorbic Acid (VITAMIN C) 1000 MG tablet Take 1,000 mg by mouth daily., Historical Med      aspirin (ECOTRIN LOW STRENGTH) 81 mg EC tablet Take 81 mg by mouth daily., Historical Med      Calcium Carb-Cholecalciferol (CALCIUM 600 + D PO) Take 1 tablet by mouth daily., Historical Med      doxycycline monohydrate (MONODOX) 100 mg capsule Take 1 pill 2 times daily with food, Historical Med      ezetimibe (ZETIA) 10 mg tablet Take 10 mg by mouth daily, Starting Wed 4/26/2023,  Historical Med      fluocinonide (LIDEX) 0.05 % external solution APPLY SOLUTION TOPICALLY TO AFFECTED AREA TWICE DAILY, Historical Med      fluticasone (FLONASE) 50 mcg/act nasal spray 2 sprays into each nostril, Starting Wed 2/24/2021, Historical Med      ketoconazole (NIZORAL) 2 % shampoo APPLY TO SCALP, LATHER, LEAVE ON 5 MINUTES AND RINSE EVERY OTHER DAY, Historical Med      Loratadine 10 MG CAPS Take 10 mg by mouth, Historical Med      Omega-3 Fatty Acids (FISH OIL) 500 MG CAPS Take 1 capsule by mouth., Historical Med      ondansetron (ZOFRAN) 4 mg tablet Take 1 tablet (4 mg total) by mouth every 6 (six) hours., Starting Wed 9/7/2016, Print      oxyCODONE-acetaminophen (PERCOCET) 5-325 mg per tablet Take 1 tablet by mouth every 4 (four) hours as needed for moderate pain. Max Daily Amount: 6 tablets, Starting Wed 9/7/2016, Print      simvastatin (ZOCOR) 20 mg tablet Take 20 mg by mouth daily at bedtime., Historical Med             No discharge procedures on file.    PDMP Review       None            ED Provider  Electronically Signed by             Yasmine Sparrow MD  04/06/24 5631

## 2024-04-05 LAB
ATRIAL RATE: 105 BPM
P AXIS: 65 DEGREES
PR INTERVAL: 184 MS
QRS AXIS: 61 DEGREES
QRSD INTERVAL: 82 MS
QT INTERVAL: 336 MS
QTC INTERVAL: 444 MS
T WAVE AXIS: 61 DEGREES
VENTRICULAR RATE: 105 BPM

## 2024-04-05 PROCEDURE — 93010 ELECTROCARDIOGRAM REPORT: CPT | Performed by: INTERNAL MEDICINE

## 2024-05-23 ENCOUNTER — OFFICE VISIT (OUTPATIENT)
Dept: CARDIOLOGY CLINIC | Facility: HOSPITAL | Age: 69
End: 2024-05-23
Payer: MEDICARE

## 2024-05-23 VITALS
WEIGHT: 154 LBS | HEIGHT: 63 IN | DIASTOLIC BLOOD PRESSURE: 78 MMHG | OXYGEN SATURATION: 97 % | HEART RATE: 90 BPM | BODY MASS INDEX: 27.29 KG/M2 | SYSTOLIC BLOOD PRESSURE: 128 MMHG

## 2024-05-23 DIAGNOSIS — E78.5 DYSLIPIDEMIA: ICD-10-CM

## 2024-05-23 DIAGNOSIS — R07.89 ATYPICAL CHEST PAIN: ICD-10-CM

## 2024-05-23 PROBLEM — L66.10 LICHEN PLANOPILARIS: Status: ACTIVE | Noted: 2024-05-23

## 2024-05-23 PROBLEM — L66.1 LICHEN PLANOPILARIS: Status: ACTIVE | Noted: 2024-05-23

## 2024-05-23 PROBLEM — N28.1 RENAL CYST, LEFT: Status: ACTIVE | Noted: 2018-04-10

## 2024-05-23 PROCEDURE — 99204 OFFICE O/P NEW MOD 45 MIN: CPT | Performed by: INTERNAL MEDICINE

## 2024-05-23 RX ORDER — ROSUVASTATIN CALCIUM 5 MG/1
5 TABLET, COATED ORAL DAILY
Qty: 30 TABLET | Refills: 11 | Status: SHIPPED | OUTPATIENT
Start: 2024-05-23

## 2024-05-23 NOTE — PROGRESS NOTES
Cardiology Consultation     Allison Mayfield  [id number]  1955  [facility] [address] [address]      1. Atypical chest pain  Echo stress test, exercise      2. Dyslipidemia  rosuvastatin (CRESTOR) 5 mg tablet    Lipid Panel With Direct LDL    Comprehensive metabolic panel          Discussion/Summary: Mrs. Mayfield is a very pleasant 68-year-old female who presents to the office today for the evaluation of chest pain.    Her chest pain is largely atypical in that it occurred at rest, persisted for hours and resolved spontaneously without any specific intervention.  It was not pleuritic, positional or related to food.  It was not exertional.  Nonetheless given her risk factors I have asked that she undergo a stress echocardiogram.    Her blood pressure is otherwise well-controlled in the office today on no medication.  Her most recent lipids reveal a suboptimal LDL despite Zetia.  She attempted numerous statins resulting in myalgias.  She is agreeable to attempting rosuvastatin.  She will start 5 mg once a week and work her way up to her maximally tolerated dose.  Once she does so she will have her lipids and LFTs reassessed.    I will relay the above-noted testing to her over the phone.  Of course if it is abnormal follow-up will be arranged.    History of Present Illness: Mrs. Mayfield is a very pleasant 68-year-old female who presents to the office today for the evaluation of chest pain.  She states about a week before Easter Sunday she developed discomfort in her chest.  It was in one specific spot described as a sharp sensation along the left side of her breastbone.  It occurred at rest.  It did not radiate.  It was not associated with any other symptoms.  It was not worse with deep breathing, position, food, exertion or palpation of her chest wall.  She states it lasted a few  hours and resolved without any specific intervention.  She states on the  she had similar discomfort in the same exact location which again lasted the same amount of time and resolved without any specific intervention.    She has not been doing much formal physical activity recently.  She states she had been walking for about two miles outdoors which included ascending a steep hill.  She would note a little shortness of breath when she would go up the hill which she thought was appropriate for the level of activity she was performing but otherwise denies any exertional chest pain or pressure with activity.  She denies any signs or symptoms of congestive heart failure including lower extremity edema, paroxysmal nocturnal dyspnea, orthopnea, acute weight gain or increasing abdominal girth.  She denies lightheadedness, syncope or presyncope.  She experiences very rare fluttering in her chest.  She denies any sustained palpitations.  She denies symptoms of claudication.    She had two pregnancies uncomplicated by gestational diabetes or pre-eclampsia.  One of the pregnancies was complicated by placenta previa necessitating a .    Her brother had quadruple bypass at the age of 64.  He is alive and well at the age of 69.  Her mother had a CABG x 4 and an mechanical valve replacement at the age of 74.  She  at 84 after suffering a broken hip.  She has a first cousin with a history of CAD who underwent CABG and valve replacement surgery at the age of 67.     She previously was on statin therapy.  She attempted two to three medications but they all caused cramping mostly in her legs.    Patient Active Problem List   Diagnosis    Renal cyst, left    Calculus of kidney    Dyslipidemia    Lichen planopilaris     Past Medical History:   Diagnosis Date    Abnormal Pap smear of cervix     Alopecia     Cervical polyp ,    Endometrial polyp     Hypercholesteremia     Lichen planus follicularis       Social History     Socioeconomic History    Marital status: /Civil Union     Spouse name: Not on file    Number of children: Not on file    Years of education: Not on file    Highest education level: Not on file   Occupational History    Occupation: director  mental health   Tobacco Use    Smoking status: Never    Smokeless tobacco: Never   Vaping Use    Vaping status: Never Used   Substance and Sexual Activity    Alcohol use: Yes     Comment: socially    Drug use: No    Sexual activity: Not Currently     Partners: Male     Birth control/protection: Post-menopausal   Other Topics Concern    Not on file   Social History Narrative    Not on file     Social Determinants of Health     Financial Resource Strain: Not on file   Food Insecurity: No Food Insecurity (3/8/2024)    Received from [facility]    Hunger Vital Sign     Worried About Running Out of Food in the Last Year: Never true     Ran Out of Food in the Last Year: Never true   Transportation Needs: Not on file   Physical Activity: Not on file   Stress: Not on file   Social Connections: Not on file   Intimate Partner Violence: Not on file   Housing Stability: Not on file      Family History   Problem Relation Age of Onset    Endometrial cancer Mother     Hypertension Mother     Endometrial cancer Sister     Breast cancer Maternal Aunt     Breast cancer Paternal Aunt     Endometrial cancer Cousin      Past Surgical History:   Procedure Laterality Date     SECTION      COLPOSCOPY      CRYOTHERAPY      DILATION AND CURETTAGE OF UTERUS      DILATION AND CURETTAGE OF UTERUS      ENDOMETRIAL BIOPSY  2009    HYSTEROSCOPY  2007       Current Outpatient Medications:     Calcium Carb-Cholecalciferol (CALCIUM 600 + D PO), Take 1 tablet by mouth daily., Disp: , Rfl:     doxycycline monohydrate (MONODOX) 100 mg capsule, Take 1 pill 2 times daily with food, Disp: , Rfl:     ezetimibe (ZETIA) 10 mg tablet, Take 10 mg by mouth daily,  Disp: , Rfl:     fluocinonide (LIDEX) 0.05 % external solution, APPLY SOLUTION TOPICALLY TO AFFECTED AREA TWICE DAILY, Disp: , Rfl:     hydrOXYzine HCL (ATARAX) 25 mg tablet, Take 1 tablet (25 mg total) by mouth daily at bedtime as needed for anxiety, Disp: 12 tablet, Rfl: 0    ketoconazole (NIZORAL) 2 % shampoo, APPLY TO SCALP, LATHER, LEAVE ON 5 MINUTES AND RINSE EVERY OTHER DAY, Disp: , Rfl:     Loratadine 10 MG CAPS, Take 10 mg by mouth, Disp: , Rfl:     rosuvastatin (CRESTOR) 5 mg tablet, Take 1 tablet (5 mg total) by mouth daily, Disp: 30 tablet, Rfl: 11    Ascorbic Acid (VITAMIN C) 1000 MG tablet, Take 1,000 mg by mouth daily. (Patient not taking: Reported on 6/19/2023), Disp: , Rfl:     aspirin (ECOTRIN LOW STRENGTH) 81 mg EC tablet, Take 81 mg by mouth daily. (Patient not taking: Reported on 6/19/2023), Disp: , Rfl:     fluticasone (FLONASE) 50 mcg/act nasal spray, 2 sprays into each nostril, Disp: , Rfl:     Omega-3 Fatty Acids (FISH OIL) 500 MG CAPS, Take 1 capsule by mouth. (Patient not taking: Reported on 6/19/2023), Disp: , Rfl:     ondansetron (ZOFRAN) 4 mg tablet, Take 1 tablet (4 mg total) by mouth every 6 (six) hours. (Patient not taking: Reported on 6/19/2023), Disp: 12 tablet, Rfl: 0    oxyCODONE-acetaminophen (PERCOCET) 5-325 mg per tablet, Take 1 tablet by mouth every 4 (four) hours as needed for moderate pain. Max Daily Amount: 6 tablets (Patient not taking: Reported on 6/19/2023), Disp: 16 tablet, Rfl: 0    simvastatin (ZOCOR) 20 mg tablet, Take 20 mg by mouth daily at bedtime. (Patient not taking: Reported on 6/19/2023), Disp: , Rfl:   Allergies   Allergen Reactions    Penicillins     Statins Other (See Comments)     Patient reports muscle cramps.          Review of Systems:  Review of Systems   Respiratory:  Negative for apnea, chest tightness, wheezing and stridor.    Cardiovascular:  Positive for chest pain. Negative for palpitations.   All other systems reviewed and are  "negative.        Vitals:    05/23/24 1421   BP: 128/78   Pulse: 90   SpO2: 97%   Weight: 69.9 kg (154 lb)   Height: 5' 3\" (1.6 m)     Vitals:    05/23/24 1421   Weight: 69.9 kg (154 lb)     Height: 5' 3\" (160 cm)     Physical Exam:  General appearance:  Appears stated age, alert, well appearing and in no distress  HEENT:  PERRLA, EOMI, no scleral icterus, no conjunctival pallor  NECK:  Supple, No elevated JVP, no thyromegaly, no carotid bruits  HEART:  Regular rate and rhythm, normal S1/S2, no S3/S4, no murmur   LUNGS:  Clear to auscultation bilaterally  ABDOMEN:  Soft, non-tender, positive bowel sounds, no rebound or guarding, no organomegaly   EXTREMITIES:  No edema  VASCULAR:  Normal pedal pulses   SKIN: No lesions or rashes on exposed skin  NEURO:  CN II-XII intact, no focal deficits  "

## 2024-05-30 ENCOUNTER — HOSPITAL ENCOUNTER (OUTPATIENT)
Dept: NON INVASIVE DIAGNOSTICS | Facility: HOSPITAL | Age: 69
Discharge: HOME | End: 2024-05-30
Attending: INTERNAL MEDICINE
Payer: MEDICARE

## 2024-05-30 VITALS
HEIGHT: 63 IN | BODY MASS INDEX: 27.29 KG/M2 | SYSTOLIC BLOOD PRESSURE: 140 MMHG | HEART RATE: 86 BPM | WEIGHT: 154 LBS | DIASTOLIC BLOOD PRESSURE: 88 MMHG

## 2024-05-30 DIAGNOSIS — R07.89 ATYPICAL CHEST PAIN: ICD-10-CM

## 2024-05-30 LAB
E WAVE DECELERATION TIME: 151 MS
E/A RATIO: 0.96
MAX HR PERCENT: 110 %
MAX HR: 169 BPM
MV PEAK A VEL: 0.82 M/S
MV PEAK E VEL: 79 CM/S
RATE PRESSURE PRODUCT: NORMAL
SL CV LV EF: 65
SL CV STRESS RECOVERY BP: NORMAL MMHG
SL CV STRESS RECOVERY HR: 100 BPM
SL CV STRESS RECOVERY O2 SAT: 99 %
SL CV STRESS STAGE REACHED: 3
STRESS BASELINE BP: 86 MMHG
STRESS BASELINE HR: 2 BPM
STRESS O2 SAT REST: 96 %
STRESS PEAK HR: 169 BPM
STRESS POST ESTIMATED WORKLOAD: 10.1 METS
STRESS POST EXERCISE DUR MIN: 9 MIN
STRESS POST EXERCISE DUR SEC: 1 SEC
STRESS POST O2 SAT PEAK: 97 %
STRESS POST PEAK BP: 184 MMHG
TR MAX PG: 28 MMHG
TR PEAK VELOCITY: 2.6 M/S
TRICUSPID VALVE PEAK REGURGITATION VELOCITY: 2.63 M/S

## 2024-05-30 PROCEDURE — 93350 STRESS TTE ONLY: CPT

## 2024-05-30 PROCEDURE — 93350 STRESS TTE ONLY: CPT | Performed by: INTERNAL MEDICINE

## 2024-05-31 LAB
CHEST PAIN STATEMENT: NORMAL
MAX DIASTOLIC BP: 90 MMHG
MAX PREDICTED HEART RATE: 153 BPM
PROTOCOL NAME: NORMAL
REASON FOR TERMINATION: NORMAL
STRESS POST EXERCISE DUR MIN: 9 MIN
STRESS POST EXERCISE DUR SEC: 1 SEC
STRESS POST PEAK HR: 169 BPM
STRESS POST PEAK SYSTOLIC BP: 184 MMHG
TARGET HR FORMULA: NORMAL
TEST INDICATION: NORMAL

## 2024-07-15 ENCOUNTER — ANNUAL EXAM (OUTPATIENT)
Dept: OBGYN CLINIC | Facility: CLINIC | Age: 69
End: 2024-07-15
Payer: MEDICARE

## 2024-07-15 VITALS
SYSTOLIC BLOOD PRESSURE: 118 MMHG | HEIGHT: 63 IN | WEIGHT: 154 LBS | BODY MASS INDEX: 27.29 KG/M2 | DIASTOLIC BLOOD PRESSURE: 70 MMHG

## 2024-07-15 DIAGNOSIS — Z01.419 ENCOUNTER FOR GYNECOLOGICAL EXAMINATION: Primary | ICD-10-CM

## 2024-07-15 PROBLEM — N81.6 RECTOCELE: Status: ACTIVE | Noted: 2024-07-15

## 2024-07-15 PROBLEM — N81.4 CYSTOCELE WITH UTERINE DESCENSUS: Status: ACTIVE | Noted: 2024-07-15

## 2024-07-15 PROCEDURE — G0101 CA SCREEN;PELVIC/BREAST EXAM: HCPCS | Performed by: OBSTETRICS & GYNECOLOGY

## 2024-07-15 NOTE — PROGRESS NOTES
ASSESSMENT & PLAN: Allison Mayfield is a 68 y.o.  with normal gynecologic exam.    1.  Routine well woman exam done today  2.  Pap and HPV:  The patient's last pap and hpv was .    It was normal.    Pap with cotesting was not done today.    Current ASCCP Guidelines reviewed.   3.  Mammogram up to date   4.  Colorectal cancer screening was notordered.  5.  The following were reviewed in today's visit: breast self exam, menopause, osteoporosis, adequate intake of calcium and vitamin D, exercise, and healthy diet.     CC:  Annual Gynecologic Examination    HPI: Allison Mayfield is a 68 y.o.  who presents for annual gynecologic examination.  She has the following concerns:  none  Health Maintenance:    She wears her seatbelt routinely.    She does perform regular monthly self breast exams.    She feels safe at home.       Past Medical History:   Diagnosis Date    Abnormal Pap smear of cervix     Alopecia     Cervical polyp ,    Endometrial polyp     Hypercholesteremia     Lichen planus follicularis        Past Surgical History:   Procedure Laterality Date     SECTION      COLPOSCOPY      CRYOTHERAPY      DILATION AND CURETTAGE OF UTERUS      DILATION AND CURETTAGE OF UTERUS  2001    ENDOMETRIAL BIOPSY  2009    HYSTEROSCOPY         Past OB/Gyn History:  OB History          2    Para   2    Term   2            AB        Living   2         SAB        IAB        Ectopic        Multiple        Live Births   2                   Family History   Problem Relation Age of Onset    Endometrial cancer Mother     Hypertension Mother     Endometrial cancer Sister     Breast cancer Maternal Aunt     Breast cancer Paternal Aunt     Endometrial cancer Cousin        Social History:  Social History     Socioeconomic History    Marital status: /Civil Union     Spouse name: Not on file    Number of children: Not on file    Years of education: Not on file    Highest  education level: Not on file   Occupational History    Occupation: director  mental health   Tobacco Use    Smoking status: Never    Smokeless tobacco: Never   Vaping Use    Vaping status: Never Used   Substance and Sexual Activity    Alcohol use: Yes     Comment: socially    Drug use: No    Sexual activity: Not Currently     Partners: Male     Birth control/protection: Post-menopausal   Other Topics Concern    Not on file   Social History Narrative    Not on file     Social Determinants of Health     Financial Resource Strain: Low Risk  (3/8/2024)    Received from [facility]    Financial Resource Strain     Do you have any trouble paying for your medications, or do you think you might in the future?: No     Does your family have trouble paying for medicine? (Household - for ages 0-17 years): Not on file   Food Insecurity: No Food Insecurity (3/8/2024)    Received from [facility]    Food Insecurity     Do you need food for this week?: No     Are you able to get enough food for your family? (Household - for ages 0-17 years): Not on file     Does your family need food this week? (Household - for ages 0-17 years): Not on file     Do you always have enough food for your family? (Household - for ages 0-17 years): Not on file   Transportation Needs: No Transportation Needs (3/8/2024)    Received from [facility]    Transportation Needs     READ ONLY Do you have trouble getting a ride to medical visits or work?: Never True     Does your family have a hard time getting a ride to doctors’ visits? (Household - for ages 0-17 years): Not on file     Has lack of transportation kept you from medical appointments, meetings, work, or from getting things needed for daily living? Check all that apply. (Adult - for ages 18 years and over): Not on file     Do you (or your family) have trouble finding or paying for a ride (transportation)? (Household - for ages 0-17 years): Not on file   Physical Activity: Not on file   Stress: Not on file    Social Connections: Socially Integrated (3/8/2024)    Received from [employer]     How often do you feel lonely or isolated from those around you?: Never   Intimate Partner Violence: Not on file   Housing Stability: Low Risk  (3/8/2024)    Received from [employer]    Housing Stability     Do you currently live in a shelter or have no steady place to sleep at night?: No     READ ONLY Do you think you are at risk of becoming homeless?: No     Does your family worry about paying for your home or becoming homeless? (Household - for ages 0-17 years): Not on file     Are you homeless or worried that you might be in the future? (Adult - for ages 18 years and over): Not on file     Are you (or your family) homeless or worried that you might be in the future? (Household - for ages 0-17 years): Not on file       Allergies   Allergen Reactions    Penicillins     Statins Other (See Comments)     Patient reports muscle cramps.          Current Outpatient Medications:     Calcium Carb-Cholecalciferol (CALCIUM 600 + D PO), Take 1 tablet by mouth daily., Disp: , Rfl:     doxycycline monohydrate (MONODOX) 100 mg capsule, Take 1 pill 2 times daily with food, Disp: , Rfl:     ezetimibe (ZETIA) 10 mg tablet, Take 10 mg by mouth daily, Disp: , Rfl:     fluocinonide (LIDEX) 0.05 % external solution, APPLY SOLUTION TOPICALLY TO AFFECTED AREA TWICE DAILY, Disp: , Rfl:     ketoconazole (NIZORAL) 2 % shampoo, APPLY TO SCALP, LATHER, LEAVE ON 5 MINUTES AND RINSE EVERY OTHER DAY, Disp: , Rfl:     Loratadine 10 MG CAPS, Take 10 mg by mouth, Disp: , Rfl:     rosuvastatin (CRESTOR) 5 mg tablet, Take 1 tablet (5 mg total) by mouth daily, Disp: 30 tablet, Rfl: 11    Ascorbic Acid (VITAMIN C) 1000 MG tablet, Take 1,000 mg by mouth daily. (Patient not taking: Reported on 6/19/2023), Disp: , Rfl:     aspirin (ECOTRIN LOW STRENGTH) 81 mg EC tablet, Take 81 mg by mouth daily. (Patient not taking: Reported on 6/19/2023), Disp: ,  "Rfl:     fluticasone (FLONASE) 50 mcg/act nasal spray, 2 sprays into each nostril, Disp: , Rfl:     hydrOXYzine HCL (ATARAX) 25 mg tablet, Take 1 tablet (25 mg total) by mouth daily at bedtime as needed for anxiety (Patient not taking: Reported on 7/15/2024), Disp: 12 tablet, Rfl: 0    Omega-3 Fatty Acids (FISH OIL) 500 MG CAPS, Take 1 capsule by mouth. (Patient not taking: Reported on 6/19/2023), Disp: , Rfl:     ondansetron (ZOFRAN) 4 mg tablet, Take 1 tablet (4 mg total) by mouth every 6 (six) hours. (Patient not taking: Reported on 6/19/2023), Disp: 12 tablet, Rfl: 0    oxyCODONE-acetaminophen (PERCOCET) 5-325 mg per tablet, Take 1 tablet by mouth every 4 (four) hours as needed for moderate pain. Max Daily Amount: 6 tablets (Patient not taking: Reported on 6/19/2023), Disp: 16 tablet, Rfl: 0    simvastatin (ZOCOR) 20 mg tablet, Take 20 mg by mouth daily at bedtime. (Patient not taking: Reported on 6/19/2023), Disp: , Rfl:     Review of Systems  Constitutional :no fever, feels well, no tiredness, no recent weight gain or loss  ENT: no ear ache, no loss of hearing, no nosebleeds or nasal discharge, no sore throat or hoarseness.  Cardiovascular: no complaints of slow or fast heart beat, no chest pain, no palpitations, no leg claudication or lower extremity edema.  Respiratory: no complaints of shortness of shortness of breath, no CORDERO  Breasts:no complaints of breast pain, breast lump, or nipple discharge  Gastrointestinal: no complaints of abdominal pain, constipation, nausea, vomiting, or diarrhea or bloody stools  Genitourinary : as noted in HPI.  Musculoskeletal: no complaints of arthralgia, no myalgia, no joint swelling or stiffness, no limb pain or swelling.  Integumentary: no complaints of skin rash or lesion, itching or dry skin  Neurological: no complaints of headache, no confusion, no numbness or tingling, no dizziness or fainting    Objective      /70   Ht 5' 3\" (1.6 m)   Wt 69.9 kg (154 lb)   " LMP  (LMP Unknown)   BMI 27.28 kg/m²   General:   appears stated age, cooperative, alert normal mood and affect   Lungs: Unlabored     Breasts: normal appearance, no masses or tenderness, Inspection negative, No nipple retraction or dimpling, No nipple discharge or bleeding, No axillary or supraclavicular adenopathy, Normal to palpation without dominant masses   Abdomen: soft, non-tender, without masses or organomegaly   Vulva: normal, normal female genitalia, Bartholin's, Urethra, Roberta normal, no lesions, normal female hair distribution, no clitoral enlargement   Vagina:  normal vagina, no discharge, exudate, lesion, or erythema   Urethra: normal   Cervix: Normal, no discharge. Nontender.   Uterus: normal size, contour, position, consistency, mobility, non-tender   Adnexa: no mass, fullness, tenderness   Psychiatric orientation to person, place, and time: normal. mood and affect: normal

## 2024-10-09 DIAGNOSIS — E78.5 DYSLIPIDEMIA: ICD-10-CM

## 2024-10-09 RX ORDER — ROSUVASTATIN CALCIUM 5 MG/1
5 TABLET, COATED ORAL DAILY
Qty: 90 TABLET | Refills: 3 | Status: SHIPPED | OUTPATIENT
Start: 2024-10-09

## 2025-04-21 ENCOUNTER — TELEPHONE (OUTPATIENT)
Age: 70
End: 2025-04-21

## 2025-04-21 DIAGNOSIS — N64.89 BREAST ASYMMETRY: Primary | ICD-10-CM

## 2025-04-21 NOTE — TELEPHONE ENCOUNTER
Elsa from [facility] called regarding scripts for Left breast diagnostic limited US /  breast asymmetry / and Left breast diagnostic mammogram. Please fax to [phone]

## 2025-07-21 ENCOUNTER — ANNUAL EXAM (OUTPATIENT)
Dept: OBGYN CLINIC | Facility: CLINIC | Age: 70
End: 2025-07-21
Payer: MEDICARE

## 2025-07-21 VITALS
HEIGHT: 63 IN | WEIGHT: 169 LBS | DIASTOLIC BLOOD PRESSURE: 80 MMHG | SYSTOLIC BLOOD PRESSURE: 134 MMHG | BODY MASS INDEX: 29.95 KG/M2

## 2025-07-21 DIAGNOSIS — N63.20 MASS OF LEFT BREAST, UNSPECIFIED QUADRANT: ICD-10-CM

## 2025-07-21 DIAGNOSIS — Z01.419 ENCOUNTER FOR GYNECOLOGICAL EXAMINATION: Primary | ICD-10-CM

## 2025-07-21 PROCEDURE — G0101 CA SCREEN;PELVIC/BREAST EXAM: HCPCS | Performed by: OBSTETRICS & GYNECOLOGY

## 2025-07-21 RX ORDER — SERTRALINE HYDROCHLORIDE 25 MG/1
25 TABLET, FILM COATED ORAL DAILY
COMMUNITY
Start: 2025-06-20

## 2025-07-21 RX ORDER — ALENDRONATE SODIUM 70 MG/1
TABLET ORAL
COMMUNITY
Start: 2025-07-10

## 2025-07-21 NOTE — PROGRESS NOTES
"Name: Allison Mayfield      : 1955      MRN: [id number]  Encounter Provider: Drea Taylor MD  Encounter Date: 2025   Encounter department: [facility]  :  Assessment & Plan  Mass of left breast, unspecified quadrant    Orders:  •  US breast left limited (diagnostic); Future    Encounter for gynecological examination       pap in  - discussed  ASCCP guidelines   Mammo up to date  -  has  follow up on left breast ordered per  previous imaging   Colonoscopy  in 2018 - cleared for  10 years   Osteoporosis  on fosamax       History of Present Illness   HPI  Allison Mayfield is a 70 y.o. female who presents for routine  GYN exam no GYN concerns   History obtained from: patient    Review of Systems   Constitutional: Negative.    HENT: Negative.     Respiratory: Negative.     Cardiovascular: Negative.    Gastrointestinal: Negative.    Genitourinary: Negative.    Neurological: Negative.    Psychiatric/Behavioral: Negative.          Objective   /80   Ht 5' 3\" (1.6 m)   Wt 76.7 kg (169 lb)   LMP  (LMP Unknown)   BMI 29.94 kg/m²      Physical Exam  Constitutional:       Appearance: Normal appearance.   HENT:      Head: Normocephalic and atraumatic.      Nose: Nose normal.     Eyes:      Conjunctiva/sclera: Conjunctivae normal.     Pulmonary:      Effort: Pulmonary effort is normal.   Chest:   Breasts:     Right: Normal. No swelling, bleeding, inverted nipple, mass, nipple discharge, skin change or tenderness.      Left: Normal. No swelling, bleeding, inverted nipple, mass, nipple discharge, skin change or tenderness.   Abdominal:      General: There is no distension.      Palpations: Abdomen is soft. There is no mass.      Tenderness: There is no abdominal tenderness. There is no guarding or rebound.      Hernia: No hernia is present.   Genitourinary:     General: Normal vulva.      Vagina: Normal.      Cervix: Normal.      Uterus: Normal.       Adnexa: " Right adnexa normal and left adnexa normal.     Neurological:      General: No focal deficit present.      Mental Status: She is alert and oriented to person, place, and time.     Psychiatric:         Mood and Affect: Mood normal.         Behavior: Behavior normal.